# Patient Record
Sex: FEMALE | Race: OTHER | HISPANIC OR LATINO
[De-identification: names, ages, dates, MRNs, and addresses within clinical notes are randomized per-mention and may not be internally consistent; named-entity substitution may affect disease eponyms.]

---

## 2017-01-18 ENCOUNTER — APPOINTMENT (OUTPATIENT)
Dept: RADIOLOGY | Facility: IMAGING CENTER | Age: 27
End: 2017-01-18

## 2017-01-18 ENCOUNTER — OUTPATIENT (OUTPATIENT)
Dept: OUTPATIENT SERVICES | Facility: HOSPITAL | Age: 27
LOS: 1 days | End: 2017-01-18
Payer: COMMERCIAL

## 2017-01-18 DIAGNOSIS — Z00.8 ENCOUNTER FOR OTHER GENERAL EXAMINATION: ICD-10-CM

## 2017-01-18 PROCEDURE — 71046 X-RAY EXAM CHEST 2 VIEWS: CPT

## 2018-04-10 ENCOUNTER — ASOB RESULT (OUTPATIENT)
Age: 28
End: 2018-04-10

## 2018-04-10 ENCOUNTER — APPOINTMENT (OUTPATIENT)
Dept: ANTEPARTUM | Facility: CLINIC | Age: 28
End: 2018-04-10
Payer: COMMERCIAL

## 2018-04-10 PROCEDURE — 76801 OB US < 14 WKS SINGLE FETUS: CPT

## 2018-04-10 PROCEDURE — 36416 COLLJ CAPILLARY BLOOD SPEC: CPT

## 2018-04-10 PROCEDURE — 76813 OB US NUCHAL MEAS 1 GEST: CPT

## 2018-06-11 ENCOUNTER — APPOINTMENT (OUTPATIENT)
Dept: ANTEPARTUM | Facility: CLINIC | Age: 28
End: 2018-06-11
Payer: COMMERCIAL

## 2018-06-11 ENCOUNTER — ASOB RESULT (OUTPATIENT)
Age: 28
End: 2018-06-11

## 2018-06-11 PROCEDURE — 76811 OB US DETAILED SNGL FETUS: CPT

## 2018-06-11 PROCEDURE — 76817 TRANSVAGINAL US OBSTETRIC: CPT | Mod: 59

## 2018-08-03 ENCOUNTER — ASOB RESULT (OUTPATIENT)
Age: 28
End: 2018-08-03

## 2018-08-03 ENCOUNTER — APPOINTMENT (OUTPATIENT)
Dept: ANTEPARTUM | Facility: CLINIC | Age: 28
End: 2018-08-03
Payer: COMMERCIAL

## 2018-08-03 PROCEDURE — 76817 TRANSVAGINAL US OBSTETRIC: CPT

## 2018-08-03 PROCEDURE — 76816 OB US FOLLOW-UP PER FETUS: CPT

## 2018-10-01 ENCOUNTER — ASOB RESULT (OUTPATIENT)
Age: 28
End: 2018-10-01

## 2018-10-01 ENCOUNTER — APPOINTMENT (OUTPATIENT)
Dept: ANTEPARTUM | Facility: CLINIC | Age: 28
End: 2018-10-01
Payer: COMMERCIAL

## 2018-10-01 PROCEDURE — 76816 OB US FOLLOW-UP PER FETUS: CPT

## 2018-10-26 ENCOUNTER — ASOB RESULT (OUTPATIENT)
Age: 28
End: 2018-10-26

## 2018-10-26 ENCOUNTER — INPATIENT (INPATIENT)
Facility: HOSPITAL | Age: 28
LOS: 3 days | Discharge: HOME | End: 2018-10-30
Attending: OBSTETRICS & GYNECOLOGY | Admitting: OBSTETRICS & GYNECOLOGY
Payer: COMMERCIAL

## 2018-10-26 ENCOUNTER — TRANSCRIPTION ENCOUNTER (OUTPATIENT)
Age: 28
End: 2018-10-26

## 2018-10-26 ENCOUNTER — APPOINTMENT (OUTPATIENT)
Dept: ANTEPARTUM | Facility: CLINIC | Age: 28
End: 2018-10-26
Payer: COMMERCIAL

## 2018-10-26 VITALS — WEIGHT: 182.98 LBS | HEIGHT: 62 IN

## 2018-10-26 DIAGNOSIS — O26.899 OTHER SPECIFIED PREGNANCY RELATED CONDITIONS, UNSPECIFIED TRIMESTER: ICD-10-CM

## 2018-10-26 DIAGNOSIS — Z3A.00 WEEKS OF GESTATION OF PREGNANCY NOT SPECIFIED: ICD-10-CM

## 2018-10-26 DIAGNOSIS — Z34.80 ENCOUNTER FOR SUPERVISION OF OTHER NORMAL PREGNANCY, UNSPECIFIED TRIMESTER: ICD-10-CM

## 2018-10-26 LAB
BASOPHILS # BLD AUTO: 0.1 K/UL — SIGNIFICANT CHANGE UP (ref 0–0.2)
BASOPHILS NFR BLD AUTO: 0.9 % — SIGNIFICANT CHANGE UP (ref 0–2)
BLD GP AB SCN SERPL QL: NEGATIVE — SIGNIFICANT CHANGE UP
EOSINOPHIL # BLD AUTO: 0.2 K/UL — SIGNIFICANT CHANGE UP (ref 0–0.5)
EOSINOPHIL NFR BLD AUTO: 1.7 % — SIGNIFICANT CHANGE UP (ref 0–6)
HCT VFR BLD CALC: 37.9 % — SIGNIFICANT CHANGE UP (ref 34.5–45)
HGB BLD-MCNC: 13.6 G/DL — SIGNIFICANT CHANGE UP (ref 11.5–15.5)
LYMPHOCYTES # BLD AUTO: 18 % — SIGNIFICANT CHANGE UP (ref 13–44)
LYMPHOCYTES # BLD AUTO: 2 K/UL — SIGNIFICANT CHANGE UP (ref 1–3.3)
MCHC RBC-ENTMCNC: 32.7 PG — SIGNIFICANT CHANGE UP (ref 27–34)
MCHC RBC-ENTMCNC: 35.8 GM/DL — SIGNIFICANT CHANGE UP (ref 32–36)
MCV RBC AUTO: 91.2 FL — SIGNIFICANT CHANGE UP (ref 80–100)
MONOCYTES # BLD AUTO: 0.7 K/UL — SIGNIFICANT CHANGE UP (ref 0–0.9)
MONOCYTES NFR BLD AUTO: 6.1 % — SIGNIFICANT CHANGE UP (ref 2–14)
NEUTROPHILS # BLD AUTO: 8 K/UL — HIGH (ref 1.8–7.4)
NEUTROPHILS NFR BLD AUTO: 73.3 % — SIGNIFICANT CHANGE UP (ref 43–77)
PLATELET # BLD AUTO: 179 K/UL — SIGNIFICANT CHANGE UP (ref 150–400)
RBC # BLD: 4.16 M/UL — SIGNIFICANT CHANGE UP (ref 3.8–5.2)
RBC # FLD: 12.1 % — SIGNIFICANT CHANGE UP (ref 10.3–14.5)
RH IG SCN BLD-IMP: POSITIVE — SIGNIFICANT CHANGE UP
RH IG SCN BLD-IMP: POSITIVE — SIGNIFICANT CHANGE UP
T PALLIDUM AB TITR SER: NEGATIVE — SIGNIFICANT CHANGE UP
WBC # BLD: 10.9 K/UL — HIGH (ref 3.8–10.5)
WBC # FLD AUTO: 10.9 K/UL — HIGH (ref 3.8–10.5)

## 2018-10-26 PROCEDURE — 76816 OB US FOLLOW-UP PER FETUS: CPT | Mod: 26

## 2018-10-26 PROCEDURE — 76818 FETAL BIOPHYS PROFILE W/NST: CPT | Mod: 26

## 2018-10-26 RX ORDER — OXYTOCIN 10 UNIT/ML
333.33 VIAL (ML) INJECTION
Qty: 20 | Refills: 0 | Status: DISCONTINUED | OUTPATIENT
Start: 2018-10-26 | End: 2018-10-27

## 2018-10-26 RX ORDER — SODIUM CHLORIDE 9 MG/ML
1000 INJECTION, SOLUTION INTRAVENOUS
Qty: 0 | Refills: 0 | Status: DISCONTINUED | OUTPATIENT
Start: 2018-10-26 | End: 2018-10-27

## 2018-10-26 RX ORDER — ONDANSETRON 8 MG/1
4 TABLET, FILM COATED ORAL ONCE
Qty: 0 | Refills: 0 | Status: COMPLETED | OUTPATIENT
Start: 2018-10-26 | End: 2018-10-26

## 2018-10-26 RX ORDER — BUTORPHANOL TARTRATE 2 MG/ML
2 INJECTION, SOLUTION INTRAMUSCULAR; INTRAVENOUS ONCE
Qty: 0 | Refills: 0 | Status: DISCONTINUED | OUTPATIENT
Start: 2018-10-26 | End: 2018-10-26

## 2018-10-26 RX ORDER — SODIUM CHLORIDE 9 MG/ML
1000 INJECTION, SOLUTION INTRAVENOUS
Qty: 0 | Refills: 0 | Status: DISCONTINUED | OUTPATIENT
Start: 2018-10-26 | End: 2018-10-26

## 2018-10-26 RX ORDER — SODIUM CHLORIDE 9 MG/ML
500 INJECTION, SOLUTION INTRAVENOUS
Qty: 0 | Refills: 0 | Status: DISCONTINUED | OUTPATIENT
Start: 2018-10-26 | End: 2018-10-30

## 2018-10-26 RX ORDER — CITRIC ACID/SODIUM CITRATE 300-500 MG
15 SOLUTION, ORAL ORAL EVERY 4 HOURS
Qty: 0 | Refills: 0 | Status: DISCONTINUED | OUTPATIENT
Start: 2018-10-26 | End: 2018-10-27

## 2018-10-26 RX ORDER — SODIUM CHLORIDE 9 MG/ML
500 INJECTION, SOLUTION INTRAVENOUS ONCE
Qty: 0 | Refills: 0 | Status: DISCONTINUED | OUTPATIENT
Start: 2018-10-26 | End: 2018-10-27

## 2018-10-26 RX ADMIN — SODIUM CHLORIDE 125 MILLILITER(S): 9 INJECTION, SOLUTION INTRAVENOUS at 11:25

## 2018-10-26 RX ADMIN — SODIUM CHLORIDE 250 MILLILITER(S): 9 INJECTION, SOLUTION INTRAVENOUS at 14:41

## 2018-10-26 RX ADMIN — BUTORPHANOL TARTRATE 2 MILLIGRAM(S): 2 INJECTION, SOLUTION INTRAMUSCULAR; INTRAVENOUS at 20:46

## 2018-10-26 RX ADMIN — ONDANSETRON 4 MILLIGRAM(S): 8 TABLET, FILM COATED ORAL at 20:45

## 2018-10-26 RX ADMIN — Medication 15 MILLILITER(S): at 14:40

## 2018-10-26 RX ADMIN — SODIUM CHLORIDE 250 MILLILITER(S): 9 INJECTION, SOLUTION INTRAVENOUS at 11:26

## 2018-10-26 RX ADMIN — BUTORPHANOL TARTRATE 2 MILLIGRAM(S): 2 INJECTION, SOLUTION INTRAMUSCULAR; INTRAVENOUS at 21:44

## 2018-10-26 NOTE — PATIENT PROFILE OB - AS SC BRADEN MOISTURE
Problem: Patient Care Overview (Adult)  Goal: Adult Individualization and Mutuality  Outcome: Ongoing (interventions implemented as appropriate)    03/03/17 0650 03/15/17 1736 03/20/17 0404   Individualization   Patient Specific Preferences --  red jello, gatorade, chocolate ice cream for snacks --    Patient Specific Goals to have a place to go when she leaves here --  --    Patient Specific Interventions --  airborne precautions --    Mutuality/Individual Preferences   What Anxieties, Fears or Concerns Do You Have About Your Health or Care? concerned about medicine and where she will go when she leaves here --  --    What Questions Do You Have About Your Health or Care? --  --  Has concerns what her ultimate diagnosis is going to be and how that will effect the rest of her life   What Information Would Help Us Give You More Personalized Care? none at this time --  --        Goal: Discharge Needs Assessment  Outcome: Ongoing (interventions implemented as appropriate)    03/01/17 1548 03/03/17 0650 03/09/17 1743   Discharge Needs Assessment   Concerns To Be Addressed --  homelessness;financial/insurance concerns;mental health concerns;transportation;medication concerns --    Readmission Within The Last 30 Days --  --  no previous admission in last 30 days   Equipment Needed After Discharge --  --  --    Current Discharge Risk homeless --  --    Discharge Disposition --  --  --    Current Health   Anticipated Changes Related to Illness --  --  none   Self-Care   Equipment Currently Used at Home --  --  --    Living Environment   Transportation Available --  --  --      03/11/17 1845 03/15/17 0904 03/22/17 1604   Discharge Needs Assessment   Concerns To Be Addressed --  --  --    Readmission Within The Last 30 Days --  --  --    Equipment Needed After Discharge none --  --    Current Discharge Risk --  --  --    Discharge Disposition --  --  still a patient   Current Health   Anticipated Changes Related to Illness --   --  --    Self-Care   Equipment Currently Used at Home --  bath bench --    Living Environment   Transportation Available --  --  taxi         Problem: Pneumonia (Adult)  Goal: Signs and Symptoms of Listed Potential Problems Will be Absent or Manageable (Pneumonia)  Outcome: Ongoing (interventions implemented as appropriate)    03/23/17 1658   Pneumonia   Problems Assessed (Pneumonia) all   Problems Present (Pneumonia) progression of infection         Problem: COPD, Chronic Bronchitis/Emphysema (Adult)  Goal: Signs and Symptoms of Listed Potential Problems Will be Absent or Manageable (COPD, Chronic Bronchitis/Emphysema)  Outcome: Ongoing (interventions implemented as appropriate)    03/23/17 0307   COPD, Chronic Bronchitis/Emphysema   Problems Assessed (COPD, Chronic Bronchitis/Emphysema) all   Problems Present (COPD, Chronic Bronchitis/Emphysema) depression;dyspnea;situational response         Problem: Wound, Traumatic, Nonburn (Adult)  Goal: Signs and Symptoms of Listed Potential Problems Will be Absent or Manageable (Wound, Traumatic, Nonburn)  Outcome: Ongoing (interventions implemented as appropriate)    03/23/17 0307   Wound, Traumatic, Nonburn   Problems Assessed (Wound) all   Problems Present (Wound) pain;skin breakdown         Problem: Pain, Acute (Adult)  Goal: Acceptable Pain Control/Comfort Level  Outcome: Ongoing (interventions implemented as appropriate)    03/23/17 1658   Pain, Acute (Adult)   Acceptable Pain Control/Comfort Level making progress toward outcome         Problem: Anxiety (Adult)  Goal: Reduction/Resolution  Outcome: Ongoing (interventions implemented as appropriate)    03/23/17 1658   Anxiety (Adult)   Reduction/Resolution making progress toward outcome         Problem: Bronchoscopy (Adult)  Goal: Signs and Symptoms of Listed Potential Problems Will be Absent or Manageable (Bronchoscopy)  Outcome: Ongoing (interventions implemented as appropriate)    03/23/17 0307   Bronchoscopy    Problems Assessed (Bronchoscopy) all   Problems Present (Bronchoscopy) none         Problem: Pulmonary Tuberculosis (Adult)  Goal: Signs and Symptoms of Listed Potential Problems Will be Absent or Manageable (Pulmonary Tuberculosis)  Outcome: Ongoing (interventions implemented as appropriate)    03/23/17 0307   Pulmonary Tuberculosis   Problems Assessed (Pulmonary Tuberculosis) all   Problems Present (Pulmonary Tuberculosis) pain;situational response            (4) rarely moist

## 2018-10-26 NOTE — PATIENT PROFILE OB - ALERT: PERTINENT HISTORY
BioPhysical Profile(s)/1st Trimester Sonogram/20 Week Level II Sonogram/Follow up Sonogram for Growth/Fetal Non-Stress Test (NST)/Non Invasive Prenatal Screen (NIPS)/Ultra Screen at 12 Weeks

## 2018-10-27 LAB
HCT VFR BLD CALC: 34.8 % — SIGNIFICANT CHANGE UP (ref 34.5–45)
HGB BLD-MCNC: 12.1 G/DL — SIGNIFICANT CHANGE UP (ref 11.5–15.5)
MCHC RBC-ENTMCNC: 31.8 PG — SIGNIFICANT CHANGE UP (ref 27–34)
MCHC RBC-ENTMCNC: 34.7 GM/DL — SIGNIFICANT CHANGE UP (ref 32–36)
MCV RBC AUTO: 91.5 FL — SIGNIFICANT CHANGE UP (ref 80–100)
PLATELET # BLD AUTO: 155 K/UL — SIGNIFICANT CHANGE UP (ref 150–400)
RBC # BLD: 3.8 M/UL — SIGNIFICANT CHANGE UP (ref 3.8–5.2)
RBC # FLD: 12.8 % — SIGNIFICANT CHANGE UP (ref 10.3–14.5)
WBC # BLD: 16 K/UL — HIGH (ref 3.8–10.5)
WBC # FLD AUTO: 16 K/UL — HIGH (ref 3.8–10.5)

## 2018-10-27 PROCEDURE — 93010 ELECTROCARDIOGRAM REPORT: CPT

## 2018-10-27 RX ORDER — FERROUS SULFATE 325(65) MG
325 TABLET ORAL DAILY
Qty: 0 | Refills: 0 | Status: DISCONTINUED | OUTPATIENT
Start: 2018-10-27 | End: 2018-10-30

## 2018-10-27 RX ORDER — HEPARIN SODIUM 5000 [USP'U]/ML
5000 INJECTION INTRAVENOUS; SUBCUTANEOUS EVERY 12 HOURS
Qty: 0 | Refills: 0 | Status: DISCONTINUED | OUTPATIENT
Start: 2018-10-27 | End: 2018-10-30

## 2018-10-27 RX ORDER — LANOLIN
1 OINTMENT (GRAM) TOPICAL
Qty: 0 | Refills: 0 | Status: DISCONTINUED | OUTPATIENT
Start: 2018-10-27 | End: 2018-10-30

## 2018-10-27 RX ORDER — OXYTOCIN 10 UNIT/ML
333.33 VIAL (ML) INJECTION
Qty: 20 | Refills: 0 | Status: DISCONTINUED | OUTPATIENT
Start: 2018-10-27 | End: 2018-10-30

## 2018-10-27 RX ORDER — SENNA PLUS 8.6 MG/1
2 TABLET ORAL AT BEDTIME
Qty: 0 | Refills: 0 | Status: DISCONTINUED | OUTPATIENT
Start: 2018-10-27 | End: 2018-10-30

## 2018-10-27 RX ORDER — OXYTOCIN 10 UNIT/ML
333.33 VIAL (ML) INJECTION
Qty: 20 | Refills: 0 | Status: COMPLETED | OUTPATIENT
Start: 2018-10-27

## 2018-10-27 RX ORDER — KETOROLAC TROMETHAMINE 30 MG/ML
30 SYRINGE (ML) INJECTION EVERY 6 HOURS
Qty: 0 | Refills: 0 | Status: DISCONTINUED | OUTPATIENT
Start: 2018-10-27 | End: 2018-10-28

## 2018-10-27 RX ORDER — ACETAMINOPHEN 500 MG
1000 TABLET ORAL ONCE
Qty: 0 | Refills: 0 | Status: COMPLETED | OUTPATIENT
Start: 2018-10-27 | End: 2018-10-28

## 2018-10-27 RX ORDER — ACETAMINOPHEN 500 MG
1000 TABLET ORAL ONCE
Qty: 0 | Refills: 0 | Status: COMPLETED | OUTPATIENT
Start: 2018-10-27 | End: 2018-10-27

## 2018-10-27 RX ORDER — GLYCERIN ADULT
1 SUPPOSITORY, RECTAL RECTAL AT BEDTIME
Qty: 0 | Refills: 0 | Status: DISCONTINUED | OUTPATIENT
Start: 2018-10-27 | End: 2018-10-30

## 2018-10-27 RX ORDER — SODIUM CHLORIDE 9 MG/ML
1000 INJECTION, SOLUTION INTRAVENOUS ONCE
Qty: 0 | Refills: 0 | Status: DISCONTINUED | OUTPATIENT
Start: 2018-10-27 | End: 2018-10-30

## 2018-10-27 RX ORDER — KETOROLAC TROMETHAMINE 30 MG/ML
30 SYRINGE (ML) INJECTION ONCE
Qty: 0 | Refills: 0 | Status: DISCONTINUED | OUTPATIENT
Start: 2018-10-27 | End: 2018-10-27

## 2018-10-27 RX ORDER — IBUPROFEN 200 MG
600 TABLET ORAL EVERY 6 HOURS
Qty: 0 | Refills: 0 | Status: COMPLETED | OUTPATIENT
Start: 2018-10-27 | End: 2019-09-25

## 2018-10-27 RX ORDER — FAMOTIDINE 10 MG/ML
20 INJECTION INTRAVENOUS ONCE
Qty: 0 | Refills: 0 | Status: COMPLETED | OUTPATIENT
Start: 2018-10-27 | End: 2018-10-27

## 2018-10-27 RX ORDER — TETANUS TOXOID, REDUCED DIPHTHERIA TOXOID AND ACELLULAR PERTUSSIS VACCINE, ADSORBED 5; 2.5; 8; 8; 2.5 [IU]/.5ML; [IU]/.5ML; UG/.5ML; UG/.5ML; UG/.5ML
0.5 SUSPENSION INTRAMUSCULAR ONCE
Qty: 0 | Refills: 0 | Status: DISCONTINUED | OUTPATIENT
Start: 2018-10-27 | End: 2018-10-30

## 2018-10-27 RX ORDER — OXYCODONE HYDROCHLORIDE 5 MG/1
5 TABLET ORAL
Qty: 0 | Refills: 0 | Status: DISCONTINUED | OUTPATIENT
Start: 2018-10-27 | End: 2018-10-30

## 2018-10-27 RX ORDER — DOCUSATE SODIUM 100 MG
100 CAPSULE ORAL
Qty: 0 | Refills: 0 | Status: DISCONTINUED | OUTPATIENT
Start: 2018-10-27 | End: 2018-10-30

## 2018-10-27 RX ORDER — ACETAMINOPHEN 500 MG
1000 TABLET ORAL EVERY 6 HOURS
Qty: 0 | Refills: 0 | Status: COMPLETED | OUTPATIENT
Start: 2018-10-27 | End: 2018-10-27

## 2018-10-27 RX ORDER — OXYCODONE HYDROCHLORIDE 5 MG/1
5 TABLET ORAL
Qty: 0 | Refills: 0 | Status: COMPLETED | OUTPATIENT
Start: 2018-10-27 | End: 2018-11-03

## 2018-10-27 RX ORDER — OXYTOCIN 10 UNIT/ML
41.67 VIAL (ML) INJECTION
Qty: 20 | Refills: 0 | Status: DISCONTINUED | OUTPATIENT
Start: 2018-10-27 | End: 2018-10-30

## 2018-10-27 RX ORDER — SODIUM CHLORIDE 9 MG/ML
1000 INJECTION, SOLUTION INTRAVENOUS
Qty: 0 | Refills: 0 | Status: DISCONTINUED | OUTPATIENT
Start: 2018-10-27 | End: 2018-10-30

## 2018-10-27 RX ORDER — DIPHENHYDRAMINE HCL 50 MG
25 CAPSULE ORAL EVERY 6 HOURS
Qty: 0 | Refills: 0 | Status: DISCONTINUED | OUTPATIENT
Start: 2018-10-27 | End: 2018-10-30

## 2018-10-27 RX ORDER — OXYCODONE HYDROCHLORIDE 5 MG/1
5 TABLET ORAL EVERY 4 HOURS
Qty: 0 | Refills: 0 | Status: COMPLETED | OUTPATIENT
Start: 2018-10-27 | End: 2018-11-03

## 2018-10-27 RX ORDER — OXYCODONE HYDROCHLORIDE 5 MG/1
5 TABLET ORAL EVERY 4 HOURS
Qty: 0 | Refills: 0 | Status: DISCONTINUED | OUTPATIENT
Start: 2018-10-27 | End: 2018-10-30

## 2018-10-27 RX ORDER — SIMETHICONE 80 MG/1
80 TABLET, CHEWABLE ORAL EVERY 4 HOURS
Qty: 0 | Refills: 0 | Status: DISCONTINUED | OUTPATIENT
Start: 2018-10-27 | End: 2018-10-30

## 2018-10-27 RX ADMIN — Medication 1000 MILLIUNIT(S)/MIN: at 04:45

## 2018-10-27 RX ADMIN — Medication 400 MILLIGRAM(S): at 22:45

## 2018-10-27 RX ADMIN — Medication 30 MILLIGRAM(S): at 18:54

## 2018-10-27 RX ADMIN — Medication 1000 MILLIGRAM(S): at 16:41

## 2018-10-27 RX ADMIN — Medication 400 MILLIGRAM(S): at 16:41

## 2018-10-27 RX ADMIN — FAMOTIDINE 20 MILLIGRAM(S): 10 INJECTION INTRAVENOUS at 09:00

## 2018-10-27 RX ADMIN — HEPARIN SODIUM 5000 UNIT(S): 5000 INJECTION INTRAVENOUS; SUBCUTANEOUS at 18:54

## 2018-10-27 RX ADMIN — Medication 1000 MILLIGRAM(S): at 23:40

## 2018-10-27 RX ADMIN — Medication 30 MILLIGRAM(S): at 07:12

## 2018-10-27 RX ADMIN — SIMETHICONE 80 MILLIGRAM(S): 80 TABLET, CHEWABLE ORAL at 16:38

## 2018-10-27 NOTE — CHART NOTE - NSCHARTNOTEFT_GEN_A_CORE
R1 Event note:     29yo POD#0 s/p uncomplicated LTCS, evaluated for c/o chest pain, dizziness, and "tingling down the back". No past medical/surgical history. Patient reports that upon being transferred from PACU to the floor, she suddenly felt tightness in the middle of her chest, associated with lightheadedness and tingling. The CP is non-radiating, and is improved with reclining. Lightheadedness occurred while in a sitting position. Tingling is non-radiating and located near the site of the epidural. Denies abdominal pain, n/v, changes in vision, and headache. Patient has not had a meal yet. Pain is well controlled. Of note, at the time of placement of the epidural, initially a high block was performed and the epidural had to be re-done.     O:   Vital Signs Last 24 Hrs  T(C): 36.9 (27 Oct 2018 07:29), Max: 37.1 (27 Oct 2018 04:40)  T(F): 98.4 (27 Oct 2018 07:29), Max: 98.8 (27 Oct 2018 04:40)  HR: 80 (27 Oct 2018 07:29) (66 - 88)  BP: 121/65 (27 Oct 2018 07:29) (121/63 - 135/68)  BP(mean): 86 (27 Oct 2018 06:55) (84 - 95)  RR: 14 (27 Oct 2018 07:29) (12 - 23)  SpO2: 96% (27 Oct 2018 07:29) (95% - 97%)    Labs:                        13.6   10.9<H> >-----------< 179    ( 10-26 @ 11:23 )             37.9    PE:  General: NAD  CV: RRR, S1/S2  Resp: CTABL   Abdomen: soft, mildly distended, NT, incision c/d/i.  : appropriate lochia  Extremities: No LE erythema/pitting edema/tenderness  EKG: normal sinus rhythm   Neuro: epidural catheter intact      A/P:   29yo POD#0 s/p uncomplicated pLTCS for arrest of descent, seen at bedside for c/o CP, lightheadedness, and paresthesias at the site of the epidural. No other associated symptoms. . Physical exam is benign. EKG wnl. Vitals are stable. Symptoms likely 2/2 the high block that resulted from the initial epidural performed. The epidural catheter was removed at bedside by pain management. Low suspicion for cardiac-related etiology and less likely a PE.    -Re-evaluate in 1 hour. If patient's symptoms have not improved, will perform pulmonary CTA to evaluate for PE. Risk factors include pregnancy and recent surgery.  -F/u stat CBC  -IV pepcid   -Start routine oral analgesics for pain control.   -Continue to monitor vitals.  -Routine postpartum care.    Patient examined w/ attending physician, Dr. Luis Rousseau PGY-1

## 2018-10-27 NOTE — PROVIDER CONTACT NOTE (CHANGE IN STATUS NOTIFICATION) - SITUATION
Called to room by patient via callbell-Patient complaining of lightheadedness, tingling down back and epigastric chest pain.

## 2018-10-27 NOTE — PROVIDER CONTACT NOTE (CHANGE IN STATUS NOTIFICATION) - SITUATION
Doctors and pain service personnel were in attendance.  12 lead ECG was done, patient was given pepcid IVP., TYLENOL IVPB were all given as ordered    as patient was verbally reassured

## 2018-10-27 NOTE — PROVIDER CONTACT NOTE (CHANGE IN STATUS NOTIFICATION) - RECOMMENDATIONS
continue with comfort care and medicate as appropriate.  pt. was observed every 15 mins. and infant was held in the well baby nursery until pt. was stable for several hours.

## 2018-10-28 LAB
BASOPHILS # BLD AUTO: 0.04 K/UL — SIGNIFICANT CHANGE UP (ref 0–0.2)
BASOPHILS NFR BLD AUTO: 0.3 % — SIGNIFICANT CHANGE UP (ref 0–2)
EOSINOPHIL # BLD AUTO: 0.26 K/UL — SIGNIFICANT CHANGE UP (ref 0–0.5)
EOSINOPHIL NFR BLD AUTO: 1.9 % — SIGNIFICANT CHANGE UP (ref 0–6)
HCT VFR BLD CALC: 32.4 % — LOW (ref 34.5–45)
HGB BLD-MCNC: 10.8 G/DL — LOW (ref 11.5–15.5)
IMM GRANULOCYTES NFR BLD AUTO: 0.6 % — SIGNIFICANT CHANGE UP (ref 0–1.5)
LYMPHOCYTES # BLD AUTO: 16.3 % — SIGNIFICANT CHANGE UP (ref 13–44)
LYMPHOCYTES # BLD AUTO: 2.24 K/UL — SIGNIFICANT CHANGE UP (ref 1–3.3)
MCHC RBC-ENTMCNC: 30.5 PG — SIGNIFICANT CHANGE UP (ref 27–34)
MCHC RBC-ENTMCNC: 33.3 GM/DL — SIGNIFICANT CHANGE UP (ref 32–36)
MCV RBC AUTO: 91.5 FL — SIGNIFICANT CHANGE UP (ref 80–100)
MONOCYTES # BLD AUTO: 0.72 K/UL — SIGNIFICANT CHANGE UP (ref 0–0.9)
MONOCYTES NFR BLD AUTO: 5.2 % — SIGNIFICANT CHANGE UP (ref 2–14)
NEUTROPHILS # BLD AUTO: 10.41 K/UL — HIGH (ref 1.8–7.4)
NEUTROPHILS NFR BLD AUTO: 75.7 % — SIGNIFICANT CHANGE UP (ref 43–77)
PLATELET # BLD AUTO: 156 K/UL — SIGNIFICANT CHANGE UP (ref 150–400)
RBC # BLD: 3.54 M/UL — LOW (ref 3.8–5.2)
RBC # FLD: 13.5 % — SIGNIFICANT CHANGE UP (ref 10.3–14.5)
WBC # BLD: 13.75 K/UL — HIGH (ref 3.8–10.5)
WBC # FLD AUTO: 13.75 K/UL — HIGH (ref 3.8–10.5)

## 2018-10-28 RX ORDER — ACETAMINOPHEN 500 MG
975 TABLET ORAL EVERY 6 HOURS
Qty: 0 | Refills: 0 | Status: COMPLETED | OUTPATIENT
Start: 2018-10-28 | End: 2019-09-26

## 2018-10-28 RX ORDER — ONDANSETRON 8 MG/1
4 TABLET, FILM COATED ORAL ONCE
Qty: 0 | Refills: 0 | Status: COMPLETED | OUTPATIENT
Start: 2018-10-28 | End: 2018-10-28

## 2018-10-28 RX ORDER — ACETAMINOPHEN 500 MG
975 TABLET ORAL EVERY 6 HOURS
Qty: 0 | Refills: 0 | Status: DISCONTINUED | OUTPATIENT
Start: 2018-10-28 | End: 2018-10-30

## 2018-10-28 RX ORDER — IBUPROFEN 200 MG
600 TABLET ORAL EVERY 6 HOURS
Qty: 0 | Refills: 0 | Status: DISCONTINUED | OUTPATIENT
Start: 2018-10-28 | End: 2018-10-30

## 2018-10-28 RX ADMIN — SIMETHICONE 80 MILLIGRAM(S): 80 TABLET, CHEWABLE ORAL at 03:05

## 2018-10-28 RX ADMIN — Medication 325 MILLIGRAM(S): at 14:41

## 2018-10-28 RX ADMIN — HEPARIN SODIUM 5000 UNIT(S): 5000 INJECTION INTRAVENOUS; SUBCUTANEOUS at 06:20

## 2018-10-28 RX ADMIN — ONDANSETRON 4 MILLIGRAM(S): 8 TABLET, FILM COATED ORAL at 14:40

## 2018-10-28 RX ADMIN — Medication 975 MILLIGRAM(S): at 21:52

## 2018-10-28 RX ADMIN — Medication 600 MILLIGRAM(S): at 18:43

## 2018-10-28 RX ADMIN — Medication 30 MILLIGRAM(S): at 07:00

## 2018-10-28 RX ADMIN — Medication 30 MILLIGRAM(S): at 00:42

## 2018-10-28 RX ADMIN — Medication 30 MILLIGRAM(S): at 01:40

## 2018-10-28 RX ADMIN — Medication 1 TABLET(S): at 14:41

## 2018-10-28 RX ADMIN — HEPARIN SODIUM 5000 UNIT(S): 5000 INJECTION INTRAVENOUS; SUBCUTANEOUS at 17:29

## 2018-10-28 RX ADMIN — Medication 30 MILLIGRAM(S): at 06:21

## 2018-10-28 RX ADMIN — Medication 30 MILLIGRAM(S): at 12:30

## 2018-10-28 RX ADMIN — Medication 975 MILLIGRAM(S): at 15:33

## 2018-10-28 RX ADMIN — Medication 1000 MILLIGRAM(S): at 06:00

## 2018-10-28 RX ADMIN — Medication 600 MILLIGRAM(S): at 17:29

## 2018-10-28 RX ADMIN — Medication 30 MILLIGRAM(S): at 11:48

## 2018-10-28 RX ADMIN — Medication 400 MILLIGRAM(S): at 05:00

## 2018-10-28 RX ADMIN — Medication 975 MILLIGRAM(S): at 14:45

## 2018-10-28 NOTE — PROGRESS NOTE ADULT - PROBLEM SELECTOR PLAN 1
- EKG 2/2 CP NSR  - Continue Pepcid daily  - Continue regular diet.  - Increase ambulation.  - Continue motrin, tylenol, oxycodone PRN for pain control  - F/u AM CBC    Lauren Yang PGY1 - EKG 2/2 CP NSR  - Continue Pepcid daily  - Continue regular diet.  - Increase ambulation.  - Continue motrin, tylenol, oxycodone PRN for pain control. IV tylenol PRN.  - F/u AM CBC    Lauren Yang PGY1

## 2018-10-28 NOTE — PROGRESS NOTE ADULT - SUBJECTIVE AND OBJECTIVE BOX
OB Progress Note:  Delivery, POD#1    S: 29yo POD#1 s/p LTCS c/b PP CP and lightheadedness. PCEA d/anish and patient feeling much improved. Her pain is well controlled on PO meds. She is tolerating a regular diet and passing flatus. Denies N/V. Denies CP/SOB/lightheadedness/dizziness.   She is ambulating without difficulty.   Voiding spontaneously. Dressing in place.    O:   Vital Signs Last 24 Hrs  T(C): 36.9 (27 Oct 2018 18:11), Max: 37.1 (27 Oct 2018 04:40)  T(F): 98.4 (27 Oct 2018 18:11), Max: 98.8 (27 Oct 2018 04:40)  HR: 78 (27 Oct 2018 18:11) (66 - 88)  BP: 113/67 (27 Oct 2018 18:11) (109/71 - 135/68)  BP(mean): 86 (27 Oct 2018 06:55) (84 - 95)  RR: 18 (27 Oct 2018 18:11) (12 - 23)  SpO2: 98% (27 Oct 2018 18:11) (95% - 99%)    Labs:  Blood type: A Positive  Rubella IgG: RPR: Negative                          12.1   16.0<H> >-----------< 155    ( 10-27 @ 09:06 )             34.8                        13.6   10.9<H> >-----------< 179    ( 10-26 @ 11:23 )             37.9                  PE:  General: NAD  Abdomen: Mildly distended, appropriately tender, incision c/d/i. Closed with...  Extremities: No erythema, no pitting edema OB Progress Note:  Delivery, POD#1    S: 29yo POD#1 s/p LTCS c/b PP CP and lightheadedness. PCEA d/anish and patient feeling much improved on PO meds. CP has resolved. Her pain is well controlled on PO meds. She is tolerating a regular diet and passing flatus. Denies N/V. Denies CP/SOB/lightheadedness/dizziness.   She is ambulating without difficulty.   Voiding spontaneously.    O:   Vital Signs Last 24 Hrs  T(C): 36.9 (27 Oct 2018 18:11), Max: 37.1 (27 Oct 2018 04:40)  T(F): 98.4 (27 Oct 2018 18:11), Max: 98.8 (27 Oct 2018 04:40)  HR: 78 (27 Oct 2018 18:11) (66 - 88)  BP: 113/67 (27 Oct 2018 18:11) (109/71 - 135/68)  BP(mean): 86 (27 Oct 2018 06:55) (84 - 95)  RR: 18 (27 Oct 2018 18:11) (12 - 23)  SpO2: 98% (27 Oct 2018 18:11) (95% - 99%)    Labs:  Blood type: A Positive  Rubella IgG: RPR: Negative                          12.1   16.0<H> >-----------< 155    ( 10-27 @ 09:06 )             34.8                        13.6   10.9<H> >-----------< 179    ( 10-26 @ 11:23 )             37.9                  PE:  General: NAD  Abdomen: Mildly distended, appropriately tender, incision c/d/i. Closed with dermabond.  Extremities: No erythema, no pitting edema

## 2018-10-28 NOTE — PROGRESS NOTE ADULT - ASSESSMENT
A/P: 29yo POD#1 s/p LTCS c/b PP CP and lightheadedness. Patient is stable and doing well post-operatively. A/P: 27yo POD#1 s/p LTCS c/b PP CP and lightheadedness now resolved with PCEA removal. Patient is stable and doing well post-operatively.

## 2018-10-29 RX ADMIN — Medication 600 MILLIGRAM(S): at 09:01

## 2018-10-29 RX ADMIN — Medication 600 MILLIGRAM(S): at 04:00

## 2018-10-29 RX ADMIN — Medication 600 MILLIGRAM(S): at 17:16

## 2018-10-29 RX ADMIN — Medication 100 MILLIGRAM(S): at 09:01

## 2018-10-29 RX ADMIN — Medication 600 MILLIGRAM(S): at 23:23

## 2018-10-29 RX ADMIN — Medication 600 MILLIGRAM(S): at 03:07

## 2018-10-29 RX ADMIN — HEPARIN SODIUM 5000 UNIT(S): 5000 INJECTION INTRAVENOUS; SUBCUTANEOUS at 07:09

## 2018-10-29 RX ADMIN — Medication 600 MILLIGRAM(S): at 10:15

## 2018-10-29 NOTE — PROGRESS NOTE ADULT - SUBJECTIVE AND OBJECTIVE BOX
Postpartum Note,  Section   ATTENDING NOTE Post-operative day 2    Subjective:  The patient feels well.  She is ambulating.   She is tolerating regular diet.  She denies nausea and vomiting.  She is voiding.  Her pain is controlled.  She reports normal postpartum bleeding    Physical exam:    Vital Signs Last 24 Hrs  T(C): 36.8 (29 Oct 2018 09:52), Max: 37.1 (28 Oct 2018 13:00)  T(F): 98.2 (29 Oct 2018 09:52), Max: 98.7 (28 Oct 2018 13:00)  HR: 76 (29 Oct 2018 09:52) (61 - 76)  BP: 119/79 (29 Oct 2018 09:52) (111/74 - 134/87)  BP(mean): --  RR: 18 (29 Oct 2018 09:52) (18 - 18)  SpO2: 97% (29 Oct 2018 06:45) (97% - 97%)    Gen: NAD  Breast: Soft, nontender, not engorged.  Abdomen: Soft, nontender, no distension , firm uterine fundus at umbilicus.  Incision: Clean, dry, and intact  Pelvic: Normal lochia noted  Ext: No calf tenderness    LABS:                        10.8   13.75 )-----------( 156      ( 28 Oct 2018 08:32 )             32.4                   Allergies    No Known Allergies    Intolerances      MEDICATIONS  (STANDING):  acetaminophen   Tablet .. 975 milliGRAM(s) Oral every 6 hours  dextrose 5% + lactated ringers. 500 milliLiter(s) (250 mL/Hr) IV Continuous <Continuous>  diphtheria/tetanus/pertussis (acellular) Vaccine (ADAcel) 0.5 milliLiter(s) IntraMuscular once  ferrous    sulfate 325 milliGRAM(s) Oral daily  heparin  Injectable 5000 Unit(s) SubCutaneous every 12 hours  ibuprofen  Tablet. 600 milliGRAM(s) Oral every 6 hours  lactated ringers Bolus 1000 milliLiter(s) IV Bolus once  lactated ringers. 1000 milliLiter(s) (125 mL/Hr) IV Continuous <Continuous>  misoprostol Oral Solution 60 MICROGram(s) Oral every 2 hours  oxyCODONE    IR 5 milliGRAM(s) Oral every 3 hours  oxytocin Infusion 41.667 milliUNIT(s)/Min (125 mL/Hr) IV Continuous <Continuous>  oxytocin Infusion 333.333 milliUNIT(s)/Min (1000 mL/Hr) IV Continuous <Continuous>  prenatal multivitamin 1 Tablet(s) Oral daily    MEDICATIONS  (PRN):  diphenhydrAMINE 25 milliGRAM(s) Oral every 6 hours PRN Itching  docusate sodium 100 milliGRAM(s) Oral two times a day PRN Stool Softening  glycerin Suppository - Adult 1 Suppository(s) Rectal at bedtime PRN Constipation  lanolin Ointment 1 Application(s) Topical every 3 hours PRN Sore Nipples  oxyCODONE    IR 5 milliGRAM(s) Oral every 4 hours PRN Severe Pain (7 - 10)  senna 2 Tablet(s) Oral at bedtime PRN Constipation  simethicone 80 milliGRAM(s) Chew every 4 hours PRN Gas        Assessment and Plan  POD # 2  s/p  section. Stable.  Encourage ambulation  Analgesia prn  Regular diet as tolerated

## 2018-10-29 NOTE — PROGRESS NOTE ADULT - SUBJECTIVE AND OBJECTIVE BOX
OB Progress Note: pTLCS, POD#2    S: 27yo G P POD#2 s/p pLTCS for arrest c/b chest pain and lightheadedness and tingling in the back on POD0 s/p PCEA. Pain is well controlled. She is tolerating a regular diet and passing flatus. She is voiding spontaneously, and ambulating without difficulty. Denies CP/SOB. Denies lightheadedness/dizziness. Denies N/V.    O:  Vitals:  Vital Signs Last 24 Hrs  T(C): 36.8 (29 Oct 2018 06:45), Max: 37.1 (28 Oct 2018 13:00)  T(F): 98.2 (29 Oct 2018 06:45), Max: 98.7 (28 Oct 2018 13:00)  HR: 67 (29 Oct 2018 06:45) (61 - 71)  BP: 126/79 (29 Oct 2018 06:45) (111/74 - 134/87)  BP(mean): --  RR: 18 (29 Oct 2018 06:45) (18 - 18)  SpO2: 97% (29 Oct 2018 06:45) (97% - 98%)    MEDICATIONS  (STANDING):  acetaminophen   Tablet .. 975 milliGRAM(s) Oral every 6 hours  dextrose 5% + lactated ringers. 500 milliLiter(s) (250 mL/Hr) IV Continuous <Continuous>  diphtheria/tetanus/pertussis (acellular) Vaccine (ADAcel) 0.5 milliLiter(s) IntraMuscular once  ferrous    sulfate 325 milliGRAM(s) Oral daily  heparin  Injectable 5000 Unit(s) SubCutaneous every 12 hours  ibuprofen  Tablet. 600 milliGRAM(s) Oral every 6 hours  lactated ringers Bolus 1000 milliLiter(s) IV Bolus once  lactated ringers. 1000 milliLiter(s) (125 mL/Hr) IV Continuous <Continuous>  misoprostol Oral Solution 60 MICROGram(s) Oral every 2 hours  oxyCODONE    IR 5 milliGRAM(s) Oral every 3 hours  oxytocin Infusion 41.667 milliUNIT(s)/Min (125 mL/Hr) IV Continuous <Continuous>  oxytocin Infusion 333.333 milliUNIT(s)/Min (1000 mL/Hr) IV Continuous <Continuous>  prenatal multivitamin 1 Tablet(s) Oral daily      MEDICATIONS  (PRN):  diphenhydrAMINE 25 milliGRAM(s) Oral every 6 hours PRN Itching  docusate sodium 100 milliGRAM(s) Oral two times a day PRN Stool Softening  glycerin Suppository - Adult 1 Suppository(s) Rectal at bedtime PRN Constipation  lanolin Ointment 1 Application(s) Topical every 3 hours PRN Sore Nipples  oxyCODONE    IR 5 milliGRAM(s) Oral every 4 hours PRN Severe Pain (7 - 10)  senna 2 Tablet(s) Oral at bedtime PRN Constipation  simethicone 80 milliGRAM(s) Chew every 4 hours PRN Gas      Labs:  Blood type: A Positive  Rubella IgG: RPR: Negative                          10.8<L>   13.75<H> >-----------< 156    ( 10-28 @ 08:32 )             32.4<L>                        12.1   16.0<H> >-----------< 155    ( 10-27 @ 09:06 )             34.8                        13.6   10.9<H> >-----------< 179    ( 10-26 @ 11:23 )             37.9                  PE:  General: NAD  Abdomen: Soft, appropriately tender, incision c/d/i.  Extremities: No erythema, no pitting edema

## 2018-10-29 NOTE — PROGRESS NOTE ADULT - PROBLEM SELECTOR PLAN 1
- Continue regular diet.  - Increase ambulation.  - Continue motrin, tylenol, oxycodone PRN for pain control.     Marlin Ascencio PGY-1

## 2018-10-30 ENCOUNTER — TRANSCRIPTION ENCOUNTER (OUTPATIENT)
Age: 28
End: 2018-10-30

## 2018-10-30 VITALS
HEART RATE: 60 BPM | RESPIRATION RATE: 18 BRPM | OXYGEN SATURATION: 97 % | TEMPERATURE: 98 F | DIASTOLIC BLOOD PRESSURE: 88 MMHG | SYSTOLIC BLOOD PRESSURE: 129 MMHG

## 2018-10-30 LAB
BASOPHILS # BLD AUTO: 0.04 K/UL — SIGNIFICANT CHANGE UP (ref 0–0.2)
BASOPHILS NFR BLD AUTO: 0.5 % — SIGNIFICANT CHANGE UP (ref 0–2)
EOSINOPHIL # BLD AUTO: 0.37 K/UL — SIGNIFICANT CHANGE UP (ref 0–0.5)
EOSINOPHIL NFR BLD AUTO: 4.7 % — SIGNIFICANT CHANGE UP (ref 0–6)
HCT VFR BLD CALC: 32.5 % — LOW (ref 34.5–45)
HGB BLD-MCNC: 11.2 G/DL — LOW (ref 11.5–15.5)
IMM GRANULOCYTES NFR BLD AUTO: 0.8 % — SIGNIFICANT CHANGE UP (ref 0–1.5)
LYMPHOCYTES # BLD AUTO: 1.9 K/UL — SIGNIFICANT CHANGE UP (ref 1–3.3)
LYMPHOCYTES # BLD AUTO: 24.1 % — SIGNIFICANT CHANGE UP (ref 13–44)
MCHC RBC-ENTMCNC: 31.6 PG — SIGNIFICANT CHANGE UP (ref 27–34)
MCHC RBC-ENTMCNC: 34.5 GM/DL — SIGNIFICANT CHANGE UP (ref 32–36)
MCV RBC AUTO: 91.8 FL — SIGNIFICANT CHANGE UP (ref 80–100)
MONOCYTES # BLD AUTO: 0.45 K/UL — SIGNIFICANT CHANGE UP (ref 0–0.9)
MONOCYTES NFR BLD AUTO: 5.7 % — SIGNIFICANT CHANGE UP (ref 2–14)
NEUTROPHILS # BLD AUTO: 5.07 K/UL — SIGNIFICANT CHANGE UP (ref 1.8–7.4)
NEUTROPHILS NFR BLD AUTO: 64.2 % — SIGNIFICANT CHANGE UP (ref 43–77)
PLATELET # BLD AUTO: 193 K/UL — SIGNIFICANT CHANGE UP (ref 150–400)
RBC # BLD: 3.54 M/UL — LOW (ref 3.8–5.2)
RBC # FLD: 13.4 % — SIGNIFICANT CHANGE UP (ref 10.3–14.5)
WBC # BLD: 7.89 K/UL — SIGNIFICANT CHANGE UP (ref 3.8–10.5)
WBC # FLD AUTO: 7.89 K/UL — SIGNIFICANT CHANGE UP (ref 3.8–10.5)

## 2018-10-30 PROCEDURE — 86780 TREPONEMA PALLIDUM: CPT

## 2018-10-30 PROCEDURE — 86850 RBC ANTIBODY SCREEN: CPT

## 2018-10-30 PROCEDURE — 85027 COMPLETE CBC AUTOMATED: CPT

## 2018-10-30 PROCEDURE — 59025 FETAL NON-STRESS TEST: CPT

## 2018-10-30 PROCEDURE — 86900 BLOOD TYPING SEROLOGIC ABO: CPT

## 2018-10-30 PROCEDURE — 86901 BLOOD TYPING SEROLOGIC RH(D): CPT

## 2018-10-30 PROCEDURE — G0463: CPT

## 2018-10-30 PROCEDURE — 93005 ELECTROCARDIOGRAM TRACING: CPT

## 2018-10-30 PROCEDURE — 59050 FETAL MONITOR W/REPORT: CPT

## 2018-10-30 RX ORDER — IBUPROFEN 200 MG
1 TABLET ORAL
Qty: 0 | Refills: 0 | DISCHARGE
Start: 2018-10-30

## 2018-10-30 RX ORDER — ACETAMINOPHEN 500 MG
3 TABLET ORAL
Qty: 0 | Refills: 0 | DISCHARGE
Start: 2018-10-30

## 2018-10-30 RX ADMIN — Medication 600 MILLIGRAM(S): at 00:23

## 2018-10-30 RX ADMIN — Medication 975 MILLIGRAM(S): at 07:00

## 2018-10-30 RX ADMIN — Medication 600 MILLIGRAM(S): at 11:16

## 2018-10-30 RX ADMIN — HEPARIN SODIUM 5000 UNIT(S): 5000 INJECTION INTRAVENOUS; SUBCUTANEOUS at 06:03

## 2018-10-30 RX ADMIN — Medication 600 MILLIGRAM(S): at 10:40

## 2018-10-30 RX ADMIN — Medication 975 MILLIGRAM(S): at 06:03

## 2018-10-30 NOTE — DISCHARGE NOTE OB - MATERIALS PROVIDED
[facility] [place] Screening Program/Bottle Feeding Log/Guide to Postpartum Care/Back To Sleep Handout/[place]  Immunization Record

## 2018-10-30 NOTE — PROGRESS NOTE ADULT - SUBJECTIVE AND OBJECTIVE BOX
OB Postpartum Note: Primary  Delivery, POD#3    S: 27yo  POD#3 s/p pLTCS for arrest. The patient feels well.  Pain is well controlled. She is tolerating a regular diet and passing flatus. She is voiding spontaneously, and ambulating without difficulty. Denies CP/SOB. Denies lightheadedness/dizziness. Denies N/V.    O:  Vitals:  Vital Signs Last 24 Hrs  T(C): 36.8 (30 Oct 2018 06:18), Max: 37.1 (29 Oct 2018 21:42)  T(F): 98.2 (30 Oct 2018 06:18), Max: 98.8 (29 Oct 2018 21:42)  HR: 57 (30 Oct 2018 06:18) (57 - 76)  BP: 139/86 (30 Oct 2018 06:18) (119/79 - 139/86)  BP(mean): --  RR: 18 (30 Oct 2018 06:18) (18 - 18)  SpO2: 95% (30 Oct 2018 06:18) (95% - 95%)    MEDICATIONS  (STANDING):  acetaminophen   Tablet .. 975 milliGRAM(s) Oral every 6 hours  dextrose 5% + lactated ringers. 500 milliLiter(s) (250 mL/Hr) IV Continuous <Continuous>  diphtheria/tetanus/pertussis (acellular) Vaccine (ADAcel) 0.5 milliLiter(s) IntraMuscular once  ferrous    sulfate 325 milliGRAM(s) Oral daily  heparin  Injectable 5000 Unit(s) SubCutaneous every 12 hours  ibuprofen  Tablet. 600 milliGRAM(s) Oral every 6 hours  lactated ringers Bolus 1000 milliLiter(s) IV Bolus once  lactated ringers. 1000 milliLiter(s) (125 mL/Hr) IV Continuous <Continuous>  misoprostol Oral Solution 60 MICROGram(s) Oral every 2 hours  oxyCODONE    IR 5 milliGRAM(s) Oral every 3 hours  oxytocin Infusion 41.667 milliUNIT(s)/Min (125 mL/Hr) IV Continuous <Continuous>  oxytocin Infusion 333.333 milliUNIT(s)/Min (1000 mL/Hr) IV Continuous <Continuous>  prenatal multivitamin 1 Tablet(s) Oral daily    MEDICATIONS  (PRN):  diphenhydrAMINE 25 milliGRAM(s) Oral every 6 hours PRN Itching  docusate sodium 100 milliGRAM(s) Oral two times a day PRN Stool Softening  glycerin Suppository - Adult 1 Suppository(s) Rectal at bedtime PRN Constipation  lanolin Ointment 1 Application(s) Topical every 3 hours PRN Sore Nipples  oxyCODONE    IR 5 milliGRAM(s) Oral every 4 hours PRN Severe Pain (7 - 10)  senna 2 Tablet(s) Oral at bedtime PRN Constipation  simethicone 80 milliGRAM(s) Chew every 4 hours PRN Gas      LABS:  Blood type: A Positive  Rubella IgG: RPR: Negative                          10.8<L>   13.75<H> >-----------< 156    ( 10-28 @ 08:32 )             32.4<L>                        12.1   16.0<H> >-----------< 155    ( 10-27 @ 09:06 )             34.8                  Physical exam:  Gen: NAD  Abdomen: Soft, nontender, no distension , firm uterine fundus at umbilicus.  Incision: Clean, dry, and intact   Pelvic: Normal lochia noted  Ext: No calf tenderness

## 2018-10-30 NOTE — PROGRESS NOTE ADULT - ATTENDING COMMENTS
Patient seen and examined by me.   Agree with above assessment and plan.  Continue with current care. Patient seen and examined by me.   Agree with above assessment and plan.  Continue with current care.  Discharge home.

## 2018-10-30 NOTE — DISCHARGE NOTE OB - CARE PROVIDER_API CALL
Max Truong (MD), Obstetrics  Gynecology  [address] [facility] [place] [place] [address]  Phone: [phone]  Fax: [phone]

## 2018-10-30 NOTE — DISCHARGE NOTE OB - PATIENT PORTAL LINK FT
You can access the [facility] Patient Portal, offered by [facility], by registering with the following website: [email or web address]

## 2018-10-30 NOTE — DISCHARGE NOTE OB - CARE PLAN
Principal Discharge DX:	 delivery delivered  Goal:	full recovery  Assessment and plan of treatment:	as tolerated

## 2019-10-20 NOTE — PROGRESS NOTE ADULT - PROBLEM SELECTOR PLAN 1
- Continue motrin, tylenol, oxycodone PRN for pain control.  - Increase ambulation  - Continue regular diet  - Discharge planning  Marlin Ascencio PGY-1 head injury

## 2020-03-04 DIAGNOSIS — Z33.2 ENCOUNTER FOR ELECTIVE TERMINATION OF PREGNANCY: ICD-10-CM

## 2020-03-13 ENCOUNTER — APPOINTMENT (OUTPATIENT)
Dept: OBGYN | Facility: CLINIC | Age: 30
End: 2020-03-13

## 2021-03-24 NOTE — PATIENT PROFILE OB - PRO CORD BLOOD BANK YN OB
During cystoscopy the following was utilized on patient with no adverse affects:    45% SODIUM CHLORIDE 500 ML BAG  Lot number: [id number]  Expiration date: Feb22      LIDOCAINE HYDROCHLORIDE JELLY 2%   Lot number: [id number]  Expiration date: 9-22 no

## 2021-04-30 ENCOUNTER — OUTPATIENT (OUTPATIENT)
Dept: OUTPATIENT SERVICES | Facility: HOSPITAL | Age: 31
LOS: 1 days | End: 2021-04-30
Payer: COMMERCIAL

## 2021-04-30 VITALS
WEIGHT: 182.1 LBS | SYSTOLIC BLOOD PRESSURE: 124 MMHG | RESPIRATION RATE: 16 BRPM | TEMPERATURE: 98 F | HEART RATE: 75 BPM | HEIGHT: 62 IN | OXYGEN SATURATION: 96 % | DIASTOLIC BLOOD PRESSURE: 78 MMHG

## 2021-04-30 DIAGNOSIS — Z98.891 HISTORY OF UTERINE SCAR FROM PREVIOUS SURGERY: ICD-10-CM

## 2021-04-30 DIAGNOSIS — K61.0 ANAL ABSCESS: Chronic | ICD-10-CM

## 2021-04-30 DIAGNOSIS — Z96.22 MYRINGOTOMY TUBE(S) STATUS: Chronic | ICD-10-CM

## 2021-04-30 DIAGNOSIS — O34.211 MATERNAL CARE FOR LOW TRANSVERSE SCAR FROM PREVIOUS CESAREAN DELIVERY: ICD-10-CM

## 2021-04-30 DIAGNOSIS — Z01.818 ENCOUNTER FOR OTHER PREPROCEDURAL EXAMINATION: ICD-10-CM

## 2021-04-30 LAB
ANION GAP SERPL CALC-SCNC: 13 MMOL/L — SIGNIFICANT CHANGE UP (ref 5–17)
BLD GP AB SCN SERPL QL: NEGATIVE — SIGNIFICANT CHANGE UP
BUN SERPL-MCNC: 8 MG/DL — SIGNIFICANT CHANGE UP (ref 7–23)
CALCIUM SERPL-MCNC: 8.6 MG/DL — SIGNIFICANT CHANGE UP (ref 8.4–10.5)
CHLORIDE SERPL-SCNC: 102 MMOL/L — SIGNIFICANT CHANGE UP (ref 96–108)
CO2 SERPL-SCNC: 19 MMOL/L — LOW (ref 22–31)
CREAT SERPL-MCNC: 0.56 MG/DL — SIGNIFICANT CHANGE UP (ref 0.5–1.3)
GLUCOSE SERPL-MCNC: 83 MG/DL — SIGNIFICANT CHANGE UP (ref 70–99)
HCT VFR BLD CALC: 36.5 % — SIGNIFICANT CHANGE UP (ref 34.5–45)
HGB BLD-MCNC: 12.5 G/DL — SIGNIFICANT CHANGE UP (ref 11.5–15.5)
MCHC RBC-ENTMCNC: 31.1 PG — SIGNIFICANT CHANGE UP (ref 27–34)
MCHC RBC-ENTMCNC: 34.2 GM/DL — SIGNIFICANT CHANGE UP (ref 32–36)
MCV RBC AUTO: 90.8 FL — SIGNIFICANT CHANGE UP (ref 80–100)
NRBC # BLD: 0 /100 WBCS — SIGNIFICANT CHANGE UP (ref 0–0)
PLATELET # BLD AUTO: 185 K/UL — SIGNIFICANT CHANGE UP (ref 150–400)
POTASSIUM SERPL-MCNC: 3.9 MMOL/L — SIGNIFICANT CHANGE UP (ref 3.5–5.3)
POTASSIUM SERPL-SCNC: 3.9 MMOL/L — SIGNIFICANT CHANGE UP (ref 3.5–5.3)
RBC # BLD: 4.02 M/UL — SIGNIFICANT CHANGE UP (ref 3.8–5.2)
RBC # FLD: 12.9 % — SIGNIFICANT CHANGE UP (ref 10.3–14.5)
RH IG SCN BLD-IMP: POSITIVE — SIGNIFICANT CHANGE UP
SODIUM SERPL-SCNC: 134 MMOL/L — LOW (ref 135–145)
WBC # BLD: 10.53 K/UL — HIGH (ref 3.8–10.5)
WBC # FLD AUTO: 10.53 K/UL — HIGH (ref 3.8–10.5)

## 2021-04-30 PROCEDURE — G0463: CPT

## 2021-04-30 PROCEDURE — 80048 BASIC METABOLIC PNL TOTAL CA: CPT

## 2021-04-30 PROCEDURE — 86850 RBC ANTIBODY SCREEN: CPT

## 2021-04-30 PROCEDURE — 86901 BLOOD TYPING SEROLOGIC RH(D): CPT

## 2021-04-30 PROCEDURE — 86900 BLOOD TYPING SEROLOGIC ABO: CPT

## 2021-04-30 PROCEDURE — 85027 COMPLETE CBC AUTOMATED: CPT

## 2021-04-30 RX ORDER — OXYTOCIN 10 UNIT/ML
333.33 VIAL (ML) INJECTION
Qty: 20 | Refills: 0 | Status: DISCONTINUED | OUTPATIENT
Start: 2021-05-10 | End: 2021-05-12

## 2021-04-30 RX ORDER — CHLORHEXIDINE GLUCONATE 213 G/1000ML
1 SOLUTION TOPICAL ONCE
Refills: 0 | Status: DISCONTINUED | OUTPATIENT
Start: 2021-05-10 | End: 2021-05-12

## 2021-04-30 NOTE — OB PST NOTE - FAMILY HISTORY
Aunt  Still living? Unknown  Family history of stroke, Age at diagnosis: Age Unknown  Family history of uterine cancer, Age at diagnosis: Age Unknown     Grandparent  Still living? Unknown  Family history of breast cancer, Age at diagnosis: Age Unknown  Family history of hypertension, Age at diagnosis: Age Unknown

## 2021-04-30 NOTE — OB PST NOTE - HISTORY OF PRESENT ILLNESS
31 year old pregnant female with no significant PMH.  PSH significant for ear tubes as child, perianal abscess, and  Section in 2018. EDC: 21;  . She is scheduled for a repeat  and tubal ligation on 5/10/21 with Dr. Jo Ann Nichols.  She denies any signs or symptoms of  labor.  She denies recent known COVID exposure, fever, chills, malaise, fatigue, diarrhea, n/v, abdominal pain, cough, SOB, chest pain, palpitations, sore throat, headache, nasal congestion, rhinnorhea, and change in taste/smell.    COVID swab scheduled for 21 at [facility]. She was given numbers to reschedule closer to home ([place]) site for 3 days prior to her surgery.

## 2021-04-30 NOTE — OB PST NOTE - ANESTHESIA, PREVIOUS REACTION, PROFILE
with prior epidural (1st pregnancy) epidural migrated up and she was experiencing difficulty breathing

## 2021-04-30 NOTE — OB PST NOTE - NS_OBGYNHISTORY_OBGYN_ALL_OB_FT
Previous  Section in 2018.  EDC: 21; .  She is scheduled for a repeat  and tubal ligation on 5/10/21. No signs or symptoms of  labor.  No fetal anomalies reported.  No preclampsia or gestational diabetes reported.

## 2021-04-30 NOTE — OB PST NOTE - PROBLEM SELECTOR PLAN 1
-Scheduled for a repeat  and Tubal Ligation on 5/10/21 with Dr. JoA nn Nichols.  -CBC, BMP, T&S today  -[facility] (she is going to try to move her appt to 3 days prior at the [place] location, closer to home).  -Preop instructions provided and patient stated understanding.  -surgical scrub provided, along with directions for use  -Gatorade instructions provided as well.  -Ample time was provided for patient to ask questions and have them answered.  -No oral medication am DOS

## 2021-04-30 NOTE — OB PST NOTE - NSHPPHYSICALEXAM_GEN_ALL_CORE
Physical Exam:  · Constitutional: well-developed; well-groomed; well-nourished; no distress  · Eyes: PERRL; conjunctiva clear  · ENMT: No oral lesions; no gross abnormalities  · Neck: supple; no JVD  · Breasts: No deformity or limitation of movement   · Respiratory: airway patent; breath sounds equal; good air movement; respirations non-labored; clear to auscultation bilaterally  · Cardiovascular: regular rate and rhythm   · Gastrointestinal: bowel sounds normal; no guarding  · Genitourinary: patient refused   · Rectal: patient refused   · Extremities: no clubbing; no cyanosis; no pedal edema  · Vascular: Equal and normal pulses (carotid, femoral, dorsalis pedis)   · Neurological: alert and oriented x 3  · Gait/Balance: gait steady  · Skin: warm and dry; color normal  · Lymph Nodes:  posterior cervical L; posterior cervical R; anterior cervical L; anterior cervical R; supraclavicular L; supraclavicular R  · Posterior Cervical L	normal  · Posterior Cervical R	normal  · Anterior Cervical L	normal  · Anterior Cervical R	normal  · Supraclavicular L	normal  · Supraclavicular R	normal  · Musculoskeletal: normal   · Psychiatric: normal affect; normal behavior  gravid uterus denies s/s of active labor

## 2021-04-30 NOTE — OB PST NOTE - HEART RATE (BEATS/MIN)
Order faxed to [facility]   75

## 2021-05-04 PROBLEM — Z78.9 OTHER SPECIFIED HEALTH STATUS: Chronic | Status: ACTIVE | Noted: 2021-04-30

## 2021-05-07 ENCOUNTER — APPOINTMENT (OUTPATIENT)
Dept: DISASTER EMERGENCY | Facility: CLINIC | Age: 31
End: 2021-05-07

## 2021-05-07 DIAGNOSIS — Z01.818 ENCOUNTER FOR OTHER PREPROCEDURAL EXAMINATION: ICD-10-CM

## 2021-05-08 LAB — SARS-COV-2 N GENE NPH QL NAA+PROBE: NOT DETECTED

## 2021-05-09 ENCOUNTER — TRANSCRIPTION ENCOUNTER (OUTPATIENT)
Age: 31
End: 2021-05-09

## 2021-05-10 ENCOUNTER — INPATIENT (INPATIENT)
Facility: HOSPITAL | Age: 31
LOS: 1 days | Discharge: HOME | End: 2021-05-12
Attending: OBSTETRICS & GYNECOLOGY | Admitting: OBSTETRICS & GYNECOLOGY
Payer: COMMERCIAL

## 2021-05-10 VITALS — HEART RATE: 90 BPM | SYSTOLIC BLOOD PRESSURE: 132 MMHG | DIASTOLIC BLOOD PRESSURE: 77 MMHG

## 2021-05-10 DIAGNOSIS — K61.0 ANAL ABSCESS: Chronic | ICD-10-CM

## 2021-05-10 DIAGNOSIS — Z01.818 ENCOUNTER FOR OTHER PREPROCEDURAL EXAMINATION: ICD-10-CM

## 2021-05-10 DIAGNOSIS — Z96.22 MYRINGOTOMY TUBE(S) STATUS: Chronic | ICD-10-CM

## 2021-05-10 DIAGNOSIS — O34.211 MATERNAL CARE FOR LOW TRANSVERSE SCAR FROM PREVIOUS CESAREAN DELIVERY: ICD-10-CM

## 2021-05-10 LAB
BLD GP AB SCN SERPL QL: NEGATIVE — SIGNIFICANT CHANGE UP
COVID-19 SPIKE DOMAIN AB INTERP: POSITIVE
COVID-19 SPIKE DOMAIN ANTIBODY RESULT: >250 U/ML — HIGH
RH IG SCN BLD-IMP: POSITIVE — SIGNIFICANT CHANGE UP
SARS-COV-2 IGG+IGM SERPL QL IA: >250 U/ML — HIGH
SARS-COV-2 IGG+IGM SERPL QL IA: POSITIVE
T PALLIDUM AB TITR SER: NEGATIVE — SIGNIFICANT CHANGE UP

## 2021-05-10 RX ORDER — NALOXONE HYDROCHLORIDE 4 MG/.1ML
0.1 SPRAY NASAL
Refills: 0 | Status: DISCONTINUED | OUTPATIENT
Start: 2021-05-10 | End: 2021-05-11

## 2021-05-10 RX ORDER — SIMETHICONE 80 MG/1
80 TABLET, CHEWABLE ORAL EVERY 4 HOURS
Refills: 0 | Status: DISCONTINUED | OUTPATIENT
Start: 2021-05-10 | End: 2021-05-12

## 2021-05-10 RX ORDER — HEPARIN SODIUM 5000 [USP'U]/ML
5000 INJECTION INTRAVENOUS; SUBCUTANEOUS EVERY 12 HOURS
Refills: 0 | Status: DISCONTINUED | OUTPATIENT
Start: 2021-05-10 | End: 2021-05-12

## 2021-05-10 RX ORDER — SODIUM CHLORIDE 9 MG/ML
1000 INJECTION, SOLUTION INTRAVENOUS
Refills: 0 | Status: DISCONTINUED | OUTPATIENT
Start: 2021-05-10 | End: 2021-05-12

## 2021-05-10 RX ORDER — NALBUPHINE HYDROCHLORIDE 10 MG/ML
2.5 INJECTION, SOLUTION INTRAMUSCULAR; INTRAVENOUS; SUBCUTANEOUS EVERY 6 HOURS
Refills: 0 | Status: DISCONTINUED | OUTPATIENT
Start: 2021-05-10 | End: 2021-05-11

## 2021-05-10 RX ORDER — SODIUM CHLORIDE 9 MG/ML
1000 INJECTION, SOLUTION INTRAVENOUS
Refills: 0 | Status: DISCONTINUED | OUTPATIENT
Start: 2021-05-10 | End: 2021-05-10

## 2021-05-10 RX ORDER — ACETAMINOPHEN 500 MG
1000 TABLET ORAL ONCE
Refills: 0 | Status: COMPLETED | OUTPATIENT
Start: 2021-05-10 | End: 2021-05-10

## 2021-05-10 RX ORDER — CITRIC ACID/SODIUM CITRATE 300-500 MG
15 SOLUTION, ORAL ORAL ONCE
Refills: 0 | Status: COMPLETED | OUTPATIENT
Start: 2021-05-10 | End: 2021-05-10

## 2021-05-10 RX ORDER — LANOLIN
1 OINTMENT (GRAM) TOPICAL EVERY 6 HOURS
Refills: 0 | Status: DISCONTINUED | OUTPATIENT
Start: 2021-05-10 | End: 2021-05-12

## 2021-05-10 RX ORDER — OXYCODONE HYDROCHLORIDE 5 MG/1
5 TABLET ORAL ONCE
Refills: 0 | Status: DISCONTINUED | OUTPATIENT
Start: 2021-05-10 | End: 2021-05-12

## 2021-05-10 RX ORDER — KETOROLAC TROMETHAMINE 30 MG/ML
30 SYRINGE (ML) INJECTION EVERY 6 HOURS
Refills: 0 | Status: COMPLETED | OUTPATIENT
Start: 2021-05-10 | End: 2021-05-12

## 2021-05-10 RX ORDER — DIPHENHYDRAMINE HCL 50 MG
25 CAPSULE ORAL EVERY 4 HOURS
Refills: 0 | Status: DISCONTINUED | OUTPATIENT
Start: 2021-05-10 | End: 2021-05-11

## 2021-05-10 RX ORDER — DIPHENHYDRAMINE HCL 50 MG
25 CAPSULE ORAL EVERY 6 HOURS
Refills: 0 | Status: DISCONTINUED | OUTPATIENT
Start: 2021-05-10 | End: 2021-05-12

## 2021-05-10 RX ORDER — OXYCODONE HYDROCHLORIDE 5 MG/1
5 TABLET ORAL
Refills: 0 | Status: DISCONTINUED | OUTPATIENT
Start: 2021-05-10 | End: 2021-05-12

## 2021-05-10 RX ORDER — ACETAMINOPHEN 500 MG
975 TABLET ORAL
Refills: 0 | Status: DISCONTINUED | OUTPATIENT
Start: 2021-05-10 | End: 2021-05-12

## 2021-05-10 RX ORDER — TETANUS TOXOID, REDUCED DIPHTHERIA TOXOID AND ACELLULAR PERTUSSIS VACCINE, ADSORBED 5; 2.5; 8; 8; 2.5 [IU]/.5ML; [IU]/.5ML; UG/.5ML; UG/.5ML; UG/.5ML
0.5 SUSPENSION INTRAMUSCULAR ONCE
Refills: 0 | Status: DISCONTINUED | OUTPATIENT
Start: 2021-05-10 | End: 2021-05-12

## 2021-05-10 RX ORDER — IBUPROFEN 200 MG
600 TABLET ORAL EVERY 6 HOURS
Refills: 0 | Status: COMPLETED | OUTPATIENT
Start: 2021-05-10 | End: 2022-04-08

## 2021-05-10 RX ORDER — OXYTOCIN 10 UNIT/ML
333.33 VIAL (ML) INJECTION
Qty: 20 | Refills: 0 | Status: DISCONTINUED | OUTPATIENT
Start: 2021-05-10 | End: 2021-05-12

## 2021-05-10 RX ORDER — MAGNESIUM HYDROXIDE 400 MG/1
30 TABLET, CHEWABLE ORAL
Refills: 0 | Status: DISCONTINUED | OUTPATIENT
Start: 2021-05-10 | End: 2021-05-12

## 2021-05-10 RX ORDER — FAMOTIDINE 10 MG/ML
20 INJECTION INTRAVENOUS ONCE
Refills: 0 | Status: COMPLETED | OUTPATIENT
Start: 2021-05-10 | End: 2021-05-10

## 2021-05-10 RX ORDER — MORPHINE SULFATE 50 MG/1
2 CAPSULE, EXTENDED RELEASE ORAL ONCE
Refills: 0 | Status: DISCONTINUED | OUTPATIENT
Start: 2021-05-10 | End: 2021-05-11

## 2021-05-10 RX ORDER — SODIUM CHLORIDE 9 MG/ML
1000 INJECTION, SOLUTION INTRAVENOUS ONCE
Refills: 0 | Status: DISCONTINUED | OUTPATIENT
Start: 2021-05-10 | End: 2021-05-10

## 2021-05-10 RX ORDER — ONDANSETRON 8 MG/1
4 TABLET, FILM COATED ORAL EVERY 6 HOURS
Refills: 0 | Status: DISCONTINUED | OUTPATIENT
Start: 2021-05-10 | End: 2021-05-11

## 2021-05-10 RX ORDER — CEFAZOLIN SODIUM 1 G
2000 VIAL (EA) INJECTION ONCE
Refills: 0 | Status: DISCONTINUED | OUTPATIENT
Start: 2021-05-10 | End: 2021-05-10

## 2021-05-10 RX ADMIN — Medication 975 MILLIGRAM(S): at 23:45

## 2021-05-10 RX ADMIN — FAMOTIDINE 20 MILLIGRAM(S): 10 INJECTION INTRAVENOUS at 11:25

## 2021-05-10 RX ADMIN — Medication 975 MILLIGRAM(S): at 23:01

## 2021-05-10 RX ADMIN — Medication 30 MILLIGRAM(S): at 19:52

## 2021-05-10 RX ADMIN — Medication 30 MILLIGRAM(S): at 20:30

## 2021-05-10 RX ADMIN — HEPARIN SODIUM 5000 UNIT(S): 5000 INJECTION INTRAVENOUS; SUBCUTANEOUS at 21:41

## 2021-05-10 RX ADMIN — Medication 15 MILLILITER(S): at 11:25

## 2021-05-10 RX ADMIN — Medication 400 MILLIGRAM(S): at 15:50

## 2021-05-10 NOTE — OB PROVIDER H&P - NSHPPHYSICALEXAM_GEN_ALL_CORE
Gen NAD  Vital Signs Last 24 Hrs  T(C): --  T(F): --  HR: 90 (10 May 2021 10:45) (82 - 111)  BP: 132/77 (10 May 2021 10:23) (132/77 - 132/77)  BP(mean): --  RR: --  SpO2: 97% (10 May 2021 10:45) (93% - 98%)    CV RRR  Lungs CTA B/L  Abd soft/NT, gravid  Ext soft, NT b/l  , Cat I  [place] occ ctx

## 2021-05-10 NOTE — OB PROVIDER H&P - ASSESSMENT
Admit to L&D  [facility]/[place]  Routine labs  IVFluids  NPO/Bicitra  Anesthesia c/s  For repeat C/sec and B/L salpingectomy  Dr. Nichols aware

## 2021-05-10 NOTE — OB RN INTRAOPERATIVE NOTE - NSSELHIDDEN_OBGYN_ALL_OB_FT
[NS_DeliveryAttending1_OBGYN_ALL_OB_FT:[id number]==] [NS_DeliveryAttending1_OBGYN_ALL_OB_FT:[id number]==],[NS_DeliveryRN_OBGYN_ALL_OB_FT:[id number]==] [NS_DeliveryAttending1_OBGYN_ALL_OB_FT:[id number]==],[NS_DeliveryRN_OBGYN_ALL_OB_FT:[id number]==],[NS_DeliveryAttending2_OBGYN_ALL_OB_FT:[id number]]

## 2021-05-10 NOTE — OB PROVIDER DELIVERY SUMMARY - NSSELHIDDEN_OBGYN_ALL_OB_FT
[NS_DeliveryAttending1_OBGYN_ALL_OB_FT:[id number]==],[NS_DeliveryRN_OBGYN_ALL_OB_FT:[id number]==]

## 2021-05-10 NOTE — OB PROVIDER H&P - HISTORY OF PRESENT ILLNESS
32yo  @ 39 1/7wks presents for scheduled rLTCS and B/L salpingectomy. (+) fetal movement. Denies ctx,  leakage of fluid or vaginal bleeding. PNC uncomplicated.   EFW 3600  GBS Neg  Ax: NKDA  MedHx: denies  Meds: none   OBHx: 2018 pLTCS, OP/FTD, Male 7#10oz  GynHx: Denies abnl paps/cysts/fibroids/endometriosis/HPV  SurgHx: c/sec, eustachian tubes  FamilyHx: MGM breast cancer  Pt accepts blood products

## 2021-05-10 NOTE — OB RN DELIVERY SUMMARY - NSSELHIDDEN_OBGYN_ALL_OB_FT
[NS_DeliveryAttending1_OBGYN_ALL_OB_FT:[id number]==],[NS_DeliveryRN_OBGYN_ALL_OB_FT:[id number]==]

## 2021-05-10 NOTE — OB PROVIDER DELIVERY SUMMARY - NSPROVIDERDELIVERYNOTE_OBGYN_ALL_OB_FT
Repeat  section, viable male infant.  Very dense adhesions of anterior serosal to anterior wall. . Unable to reach fundal area to do tubal ligation, Pt informed.  cmelgar Scheduled rLTCS. Dense adhesions of uterus to anterior abdominal wall. Uterus unable to be mobilized at the fundus. Adnexa unable to be visualized. Bladder back-filled with 250cc of methylene blue to demarcate the bladder margins for lysis of adhesions. Viable male infant deliver from vertex presentation. Clear fluid on amniotomy. APGARS 9,9. Weight 7#9. Hysterotomy repaired in running locked fashion. Hemostatic fig of 8 stitches thrown on raw surfaces where adhesions were taken down. Surgicel powder, davonte and fibrillar applied over hysterotomy repair and raw ant uterine surfaces with good hemostasis achieved. , IVF 2000, .       Repeat  section, viable male infant.  Very dense adhesions of anterior serosal to anterior wall. . Unable to reach fundal area to do tubal ligation, Pt informed.  cmelgar Scheduled rLTCS. Dense adhesions of uterus to anterior abdominal wall. Uterus unable to be mobilized at the fundus. Adnexa unable to be visualized. Bladder back-filled with 250cc of methylene blue to demarcate the bladder margins for lysis of adhesions. Viable male infant delivered from vertex presentation. Clear fluid on amniotomy. APGARS 9,9. Weight 7#9. Hysterotomy repaired in running locked fashion. Hemostatic fig of 8 stitches thrown on raw surfaces where adhesions were taken down. Surgicel powder, davonte and fibrillar applied over hysterotomy repair and raw ant uterine surfaces with good hemostasis achieved. , IVF 2000, .       Repeat  section, viable male infant.  Very dense adhesions of anterior serosal to anterior wall. . Unable to reach fundal area to do tubal ligation, Pt informed.  cmelgar

## 2021-05-10 NOTE — OB RN DELIVERY SUMMARY - NS_SEPSISRSKCALC_OBGYN_ALL_OB_FT
EOS calculated successfully. EOS Risk Factor: 0.5/1000 live births ([facility] national incidence); GA=39w1d; Temp=98.2; ROM=0.017; GBS='Unknown'; Antibiotics='No antibiotics or any antibiotics < 2 hrs prior to birth'

## 2021-05-10 NOTE — OB NEONATOLOGY/PEDIATRICIAN DELIVERY SUMMARY - NSPEDSNEONOTESA_OBGYN_ALL_OB_FT
JORDI: Baby is a 39.1 week GA M born to a 30y/o  mother via rCS. Maternal blood type A+. Maternal history unremarkable. Pregnancy uncomplicated. Prenatal labs negative, non-reactive, and immune. GBS unknown. ROM at delivery with clear fluids. Baby born vigorous and crying. Warmed, dried, stimulated, suctioned. Void x2. APGARs 9/9. EOS 0.03. Bottle-feeding. Consents Hep B vaccine. Yes to circ.

## 2021-05-11 LAB
BASOPHILS # BLD AUTO: 0.04 K/UL — SIGNIFICANT CHANGE UP (ref 0–0.2)
BASOPHILS NFR BLD AUTO: 0.2 % — SIGNIFICANT CHANGE UP (ref 0–2)
EOSINOPHIL # BLD AUTO: 0.02 K/UL — SIGNIFICANT CHANGE UP (ref 0–0.5)
EOSINOPHIL NFR BLD AUTO: 0.1 % — SIGNIFICANT CHANGE UP (ref 0–6)
HCT VFR BLD CALC: 34.7 % — SIGNIFICANT CHANGE UP (ref 34.5–45)
HGB BLD-MCNC: 11.7 G/DL — SIGNIFICANT CHANGE UP (ref 11.5–15.5)
IMM GRANULOCYTES NFR BLD AUTO: 0.7 % — SIGNIFICANT CHANGE UP (ref 0–1.5)
LYMPHOCYTES # BLD AUTO: 1.96 K/UL — SIGNIFICANT CHANGE UP (ref 1–3.3)
LYMPHOCYTES # BLD AUTO: 11.7 % — LOW (ref 13–44)
MCHC RBC-ENTMCNC: 31.4 PG — SIGNIFICANT CHANGE UP (ref 27–34)
MCHC RBC-ENTMCNC: 33.7 GM/DL — SIGNIFICANT CHANGE UP (ref 32–36)
MCV RBC AUTO: 93 FL — SIGNIFICANT CHANGE UP (ref 80–100)
MONOCYTES # BLD AUTO: 1.07 K/UL — HIGH (ref 0–0.9)
MONOCYTES NFR BLD AUTO: 6.4 % — SIGNIFICANT CHANGE UP (ref 2–14)
NEUTROPHILS # BLD AUTO: 13.55 K/UL — HIGH (ref 1.8–7.4)
NEUTROPHILS NFR BLD AUTO: 80.9 % — HIGH (ref 43–77)
NRBC # BLD: 0 /100 WBCS — SIGNIFICANT CHANGE UP (ref 0–0)
PLATELET # BLD AUTO: 172 K/UL — SIGNIFICANT CHANGE UP (ref 150–400)
RBC # BLD: 3.73 M/UL — LOW (ref 3.8–5.2)
RBC # FLD: 13.2 % — SIGNIFICANT CHANGE UP (ref 10.3–14.5)
WBC # BLD: 16.75 K/UL — HIGH (ref 3.8–10.5)
WBC # FLD AUTO: 16.75 K/UL — HIGH (ref 3.8–10.5)

## 2021-05-11 RX ORDER — IBUPROFEN 200 MG
600 TABLET ORAL EVERY 6 HOURS
Refills: 0 | Status: DISCONTINUED | OUTPATIENT
Start: 2021-05-11 | End: 2021-05-12

## 2021-05-11 RX ORDER — SENNA PLUS 8.6 MG/1
1 TABLET ORAL ONCE
Refills: 0 | Status: DISCONTINUED | OUTPATIENT
Start: 2021-05-11 | End: 2021-05-12

## 2021-05-11 RX ADMIN — Medication 975 MILLIGRAM(S): at 11:45

## 2021-05-11 RX ADMIN — Medication 30 MILLIGRAM(S): at 01:51

## 2021-05-11 RX ADMIN — Medication 30 MILLIGRAM(S): at 09:35

## 2021-05-11 RX ADMIN — Medication 975 MILLIGRAM(S): at 23:51

## 2021-05-11 RX ADMIN — Medication 600 MILLIGRAM(S): at 21:30

## 2021-05-11 RX ADMIN — Medication 975 MILLIGRAM(S): at 05:28

## 2021-05-11 RX ADMIN — Medication 600 MILLIGRAM(S): at 14:38

## 2021-05-11 RX ADMIN — Medication 30 MILLIGRAM(S): at 02:30

## 2021-05-11 RX ADMIN — Medication 975 MILLIGRAM(S): at 11:06

## 2021-05-11 RX ADMIN — Medication 975 MILLIGRAM(S): at 06:20

## 2021-05-11 RX ADMIN — HEPARIN SODIUM 5000 UNIT(S): 5000 INJECTION INTRAVENOUS; SUBCUTANEOUS at 08:44

## 2021-05-11 RX ADMIN — Medication 600 MILLIGRAM(S): at 20:47

## 2021-05-11 RX ADMIN — Medication 600 MILLIGRAM(S): at 15:30

## 2021-05-11 RX ADMIN — Medication 975 MILLIGRAM(S): at 18:00

## 2021-05-11 RX ADMIN — Medication 30 MILLIGRAM(S): at 08:44

## 2021-05-11 RX ADMIN — HEPARIN SODIUM 5000 UNIT(S): 5000 INJECTION INTRAVENOUS; SUBCUTANEOUS at 20:47

## 2021-05-11 RX ADMIN — Medication 975 MILLIGRAM(S): at 17:07

## 2021-05-11 NOTE — PROVIDER CONTACT NOTE (OTHER) - ACTION/TREATMENT ORDERED:
dr westbrook aware. pt to be bladder scanned and if showing residual urine perform straight catheterization per dr freeman.

## 2021-05-12 ENCOUNTER — TRANSCRIPTION ENCOUNTER (OUTPATIENT)
Age: 31
End: 2021-05-12

## 2021-05-12 VITALS
OXYGEN SATURATION: 97 % | DIASTOLIC BLOOD PRESSURE: 67 MMHG | TEMPERATURE: 98 F | RESPIRATION RATE: 18 BRPM | HEART RATE: 59 BPM | SYSTOLIC BLOOD PRESSURE: 109 MMHG

## 2021-05-12 PROCEDURE — 86850 RBC ANTIBODY SCREEN: CPT

## 2021-05-12 PROCEDURE — 86780 TREPONEMA PALLIDUM: CPT

## 2021-05-12 PROCEDURE — 86769 SARS-COV-2 COVID-19 ANTIBODY: CPT

## 2021-05-12 PROCEDURE — 85025 COMPLETE CBC W/AUTO DIFF WBC: CPT

## 2021-05-12 PROCEDURE — 86901 BLOOD TYPING SEROLOGIC RH(D): CPT

## 2021-05-12 PROCEDURE — C1889: CPT

## 2021-05-12 PROCEDURE — 59025 FETAL NON-STRESS TEST: CPT

## 2021-05-12 PROCEDURE — 86900 BLOOD TYPING SEROLOGIC ABO: CPT

## 2021-05-12 PROCEDURE — 59050 FETAL MONITOR W/REPORT: CPT

## 2021-05-12 RX ORDER — CALCIUM CARBONATE 500(1250)
1 TABLET ORAL
Qty: 0 | Refills: 0 | DISCHARGE

## 2021-05-12 RX ORDER — IBUPROFEN 200 MG
1 TABLET ORAL
Qty: 0 | Refills: 0 | DISCHARGE
Start: 2021-05-12

## 2021-05-12 RX ADMIN — Medication 975 MILLIGRAM(S): at 12:50

## 2021-05-12 RX ADMIN — Medication 975 MILLIGRAM(S): at 00:35

## 2021-05-12 RX ADMIN — Medication 600 MILLIGRAM(S): at 10:00

## 2021-05-12 RX ADMIN — Medication 975 MILLIGRAM(S): at 06:08

## 2021-05-12 RX ADMIN — Medication 600 MILLIGRAM(S): at 04:31

## 2021-05-12 RX ADMIN — HEPARIN SODIUM 5000 UNIT(S): 5000 INJECTION INTRAVENOUS; SUBCUTANEOUS at 08:06

## 2021-05-12 RX ADMIN — Medication 975 MILLIGRAM(S): at 06:52

## 2021-05-12 RX ADMIN — Medication 600 MILLIGRAM(S): at 03:51

## 2021-05-12 RX ADMIN — Medication 600 MILLIGRAM(S): at 09:18

## 2021-05-12 RX ADMIN — Medication 975 MILLIGRAM(S): at 11:47

## 2021-05-12 NOTE — PROGRESS NOTE ADULT - SUBJECTIVE AND OBJECTIVE BOX
Postpartum Note,  Section   ATTENDING NOTE - Post-operative day 1    Subjective:  The patient feels well. Ambulating without difficulty  Pt is tolerating regular diet. Pain well controlled.  She denies nausea and vomiting.     zuniga dc'd pt already voided    She reports normal postpartum bleeding    Physical exam:    Vital Signs Last 24 Hrs  T(C): 36.8 (11 May 2021 09:15), Max: 36.8 (10 May 2021 10:45)  T(F): 98.2 (11 May 2021 09:15), Max: 98.2 (10 May 2021 10:45)  HR: 60 (11 May 2021 09:15) (55 - 111)  BP: 99/62 (11 May 2021 09:15) (93/59 - 132/77)  BP(mean): 81 (10 May 2021 17:10) (77 - 86)  RR: 18 (11 May 2021 09:15) (16 - 20)  SpO2: 97% (11 May 2021 09:15) (93% - 99%)    Gen: NAD  Breast: Soft, nontender, not engorged.  Abdomen: Soft, nontender, no distension , firm uterine fundus at umbilicus.  Incision: Clean, dry, and intact  Pelvic: Normal lochia noted  Ext: No calf tenderness, no hyper reflexia       LABS:                            11.7   16.75 )-----------( 172      ( 11 May 2021 07:28 )             34.7     ABO Interpretation: A (05-10 @ 11:15)  Rh Interpretation: Positive (0510 @ 11:15)  Antibody Screen: Negative (05-10 @ 11:15)                Allergies    No Known Allergies    Intolerances      MEDICATIONS  (STANDING):  acetaminophen   Tablet .. 975 milliGRAM(s) Oral <User Schedule>  chlorhexidine 2% Cloths 1 Application(s) Topical once  diphtheria/tetanus/pertussis (acellular) Vaccine (ADAcel) 0.5 milliLiter(s) IntraMuscular once  heparin   Injectable 5000 Unit(s) SubCutaneous every 12 hours  ibuprofen  Tablet. 600 milliGRAM(s) Oral every 6 hours  ketorolac   Injectable 30 milliGRAM(s) IV Push every 6 hours  lactated ringers. 1000 milliLiter(s) (125 mL/Hr) IV Continuous <Continuous>  morphine PF Epidural 2 milliGRAM(s) Epidural once  oxytocin Infusion 333.333 milliUNIT(s)/Min (1000 mL/Hr) IV Continuous <Continuous>  oxytocin Infusion 333.333 milliUNIT(s)/Min (1000 mL/Hr) IV Continuous <Continuous>    MEDICATIONS  (PRN):  diphenhydrAMINE 25 milliGRAM(s) Oral every 4 hours PRN Pruritus  diphenhydrAMINE 25 milliGRAM(s) Oral every 6 hours PRN Pruritus  lanolin Ointment 1 Application(s) Topical every 6 hours PRN Sore Nipples  magnesium hydroxide Suspension 30 milliLiter(s) Oral two times a day PRN Constipation  nalbuphine Injectable 2.5 milliGRAM(s) IV Push every 6 hours PRN Pruritus  naloxone Injectable 0.1 milliGRAM(s) IV Push every 3 minutes PRN For ANY of the following changes in patient status:  A. RR LESS THAN 10 breaths per minute, B. Oxygen saturation LESS THAN 90%, C. Sedation score of 6  ondansetron Injectable 4 milliGRAM(s) IV Push every 6 hours PRN Nausea  oxyCODONE    IR 5 milliGRAM(s) Oral every 3 hours PRN Moderate to Severe Pain (4-10)  oxyCODONE    IR 5 milliGRAM(s) Oral once PRN Moderate to Severe Pain (4-10)  simethicone 80 milliGRAM(s) Chew every 4 hours PRN Gas        Assessment and Plan  POD # 1  s/p  section. Stable.  Encourage ambulation  Continue with PCEA/PCA  Regular diet as tolerated  Follow up CBC            
Postpartum Note-  Section POD#1    Allergies  No Known Allergies    Intolerances  Denies    Blood Type  A  --  Positive  Rubella Immune  RPR Neg    S: Patient is a  31y  POD#1 S/P repeat C/Sec and b/l salpingectomy  Patient w/o complaints, pain is controlled.  Pt is OOB, tolerating PO. Patient denies passing flatus. Lochia WNL.   Feeding: Breast and bottle feeding    O:  Vital Signs Last 24 Hrs  T(C): 36.6 (11 May 2021 04:56), Max: 36.8 (10 May 2021 10:45)  T(F): 97.8 (11 May 2021 04:56), Max: 98.2 (10 May 2021 10:45)  HR: 60 (11 May 2021 04:56) (55 - 111)  BP: 93/59 (11 May 2021 04:56) (93/59 - 132/77)  BP(mean): 81 (10 May 2021 17:10) (77 - 86)  RR: 18 (11 May 2021 04:56) (16 - 20)  SpO2: 98% (11 May 2021 04:56) (93% - 99%)  I&O's Summary    10 May 2021 07:01  -  11 May 2021 07:00  --------------------------------------------------------  IN: 3000 mL / OUT: 2000 mL / NET: 1000 mL        Gen: NAD  CV: rrr s1s2, CTABL  Abdomen: Soft, nontender, non-distended, fundus firm.  Bowel sounds x 4 quadrants  Incision: Clean, dry, and intact.  Negative erythema/edema/ecchymosis   Sub Q  Lochia WNL  Ext: PAS in place. Negative Homans B/L.  Pedal pulses palpated B/L    LABS:        A/P:  31y  POD # 1 S/P  repeat  section, doing well    PMHx:   delivery delivered 2018  Perianal abscess  History of placement of ear tubes as child    Current Issues:  None    Increase OOB  DVT ppx  Dressing removed  Regular diet  AM CBC  Routine Postpartum/Post-op care    Yolanda Greenfield PA-C      
Day 1 of Anesthesia Pain Management Service    SUBJECTIVE: Doing ok  Pain Scale Score:          [X] Refer to charted pain scores    THERAPY:  s/p   2  mg PF epidural morphine on 5\10\2021      MEDICATIONS  (STANDING):  acetaminophen   Tablet .. 975 milliGRAM(s) Oral <User Schedule>  chlorhexidine 2% Cloths 1 Application(s) Topical once  diphtheria/tetanus/pertussis (acellular) Vaccine (ADAcel) 0.5 milliLiter(s) IntraMuscular once  heparin   Injectable 5000 Unit(s) SubCutaneous every 12 hours  ibuprofen  Tablet. 600 milliGRAM(s) Oral every 6 hours  ketorolac   Injectable 30 milliGRAM(s) IV Push every 6 hours  lactated ringers. 1000 milliLiter(s) (125 mL/Hr) IV Continuous <Continuous>  morphine PF Epidural 2 milliGRAM(s) Epidural once  oxytocin Infusion 333.333 milliUNIT(s)/Min (1000 mL/Hr) IV Continuous <Continuous>  oxytocin Infusion 333.333 milliUNIT(s)/Min (1000 mL/Hr) IV Continuous <Continuous>    MEDICATIONS  (PRN):  diphenhydrAMINE 25 milliGRAM(s) Oral every 4 hours PRN Pruritus  diphenhydrAMINE 25 milliGRAM(s) Oral every 6 hours PRN Pruritus  lanolin Ointment 1 Application(s) Topical every 6 hours PRN Sore Nipples  magnesium hydroxide Suspension 30 milliLiter(s) Oral two times a day PRN Constipation  nalbuphine Injectable 2.5 milliGRAM(s) IV Push every 6 hours PRN Pruritus  naloxone Injectable 0.1 milliGRAM(s) IV Push every 3 minutes PRN For ANY of the following changes in patient status:  A. RR LESS THAN 10 breaths per minute, B. Oxygen saturation LESS THAN 90%, C. Sedation score of 6  ondansetron Injectable 4 milliGRAM(s) IV Push every 6 hours PRN Nausea  oxyCODONE    IR 5 milliGRAM(s) Oral every 3 hours PRN Moderate to Severe Pain (4-10)  oxyCODONE    IR 5 milliGRAM(s) Oral once PRN Moderate to Severe Pain (4-10)  simethicone 80 milliGRAM(s) Chew every 4 hours PRN Gas      OBJECTIVE:    Sedation:        	[X] Alert	 [ ] Drowsy	[ ] Arousable      [ ] Asleep       [ ] Unresponsive    Side Effects:	[ ] None 	[ ] Nausea	[ ] Vomiting         [ ] Pruritus  		[ ] Weakness            [ ] Numbness	          [x ] Other: urinary retention    Vital Signs Last 24 Hrs  T(C): 36.8 (11 May 2021 09:15), Max: 36.8 (10 May 2021 10:45)  T(F): 98.2 (11 May 2021 09:15), Max: 98.2 (10 May 2021 10:45)  HR: 60 (11 May 2021 09:15) (55 - 111)  BP: 99/62 (11 May 2021 09:15) (93/59 - 132/77)  BP(mean): 81 (10 May 2021 17:10) (77 - 86)  RR: 18 (11 May 2021 09:15) (16 - 20)  SpO2: 97% (11 May 2021 09:15) (93% - 99%)    ASSESSMENT/ PLAN  [X] Patient to be transitioned to prn analgesics later today  [X] Pain management per primary service, pain service to sign off   [X]Documentation and Verification of current medications
Pain Management Attending Addendum    SUBJECTIVE:    Therapy:	  PCA	   Epidural           s/p Spinal Opoid x             Postpartum infusion	  Patient controlled regional anesthesia (PCRA)    prn Analgesics    OBJECTIVE: No new signs   x   Other:    Side Effects:    Nonex			 Other:    Assessment of Catheter Site:		 Intact		 Other:    ASSESSMENT/PLAN  Continue current therapy    Therapy changed to:     IV PCA        Epidural      prn Analgesics x    post partum infusion    Comments:
Postpartum Note -  Section POD #2    S: Patient is a 32 y/o  POD#2 s/p repeat  section. Patient is without complaints and pain is well controlled. Pt is OOB, tolerating PO, and passing flatus.  Lochia is WNL. Patient is breast and bottle feeding.    O:  Vital Signs Last 24 Hrs  T(C): 36.7 (12 May 2021 05:25), Max: 36.8 (11 May 2021 09:15)  T(F): 98 (12 May 2021 05:25), Max: 98.2 (11 May 2021 09:15)  HR: 59 (12 May 2021 05:25) (59 - 73)  BP: 109/67 (12 May 2021 05:25) (91/60 - 109/67)  BP(mean): --  RR: 18 (12 May 2021 05:25) (18 - 18)  SpO2: 97% (12 May 2021 05:25) (97% - 99%)    PE:  General: well appearing, well nourished, in no acute distress  Neuro: AOx3  Cardiovascular: S1, S2. Regular rate and rhythm. No murmurs, rubs, or gallops  Pulm: Clear to auscultation bilaterally  Abdomen: soft, non-tender, non-distended, fundus firm. Bowel sound in all 4 quadrants.   Incision: Clean, dry, and intact. No erythema, edema, or ecchymosis. SubQ sutures.  Gyn: Lochia WNL  Ext: Negative Homans b/l. No tenderness to palpation of calves.

## 2021-05-12 NOTE — PROGRESS NOTE ADULT - ASSESSMENT
30 y/o  POD#2 s/p repeat  section, doing well    Plan:  -Regular diet  -OOB  -Routine postpartum/post-op care    BERTA LoeraS
31y  POD # 1 S/P  repeat  section, doing well    PMHx:   delivery delivered 2018  Perianal abscess  History of placement of ear tubes as child    Current Issues:  None

## 2021-05-12 NOTE — DISCHARGE NOTE OB - CARE PROVIDER_API CALL
Jo Ann Nichols)  Obstetrics and Gynecology  [address] [place] [address]  Phone: [phone]  Fax: [phone]  Follow Up Time:

## 2021-05-12 NOTE — DISCHARGE NOTE OB - PATIENT PORTAL LINK FT
You can access the FollowMyHealth Patient Portal offered by [facility] by registering at the following website: [email or web address]. By joining [employer]’s FollowMyHealth portal, you will also be able to view your health information using other applications (apps) compatible with our system.

## 2021-05-12 NOTE — DISCHARGE NOTE OB - CARE PLAN
Principal Discharge DX:	 delivery delivered  Goal:	postpartum recovery  Assessment and plan of treatment:	full instructions given  rto 2 weeks or as necessary

## 2022-04-09 ENCOUNTER — TRANSCRIPTION ENCOUNTER (OUTPATIENT)
Age: 32
End: 2022-04-09

## 2022-04-25 ENCOUNTER — TRANSCRIPTION ENCOUNTER (OUTPATIENT)
Age: 32
End: 2022-04-25

## 2022-12-05 ENCOUNTER — EMERGENCY (EMERGENCY)
Facility: HOSPITAL | Age: 32
LOS: 1 days | Discharge: HOME | End: 2022-12-05
Admitting: EMERGENCY MEDICINE

## 2022-12-05 VITALS
RESPIRATION RATE: 16 BRPM | DIASTOLIC BLOOD PRESSURE: 74 MMHG | SYSTOLIC BLOOD PRESSURE: 134 MMHG | TEMPERATURE: 98 F | HEART RATE: 85 BPM

## 2022-12-05 DIAGNOSIS — Z96.22 MYRINGOTOMY TUBE(S) STATUS: Chronic | ICD-10-CM

## 2022-12-05 DIAGNOSIS — K61.0 ANAL ABSCESS: Chronic | ICD-10-CM

## 2022-12-05 PROCEDURE — 99284 EMERGENCY DEPT VISIT MOD MDM: CPT

## 2022-12-05 RX ORDER — HYDROXYZINE HCL 10 MG
1 TABLET ORAL
Qty: 12 | Refills: 0
Start: 2022-12-05 | End: 2022-12-07

## 2022-12-05 RX ADMIN — Medication 2 MILLIGRAM(S): at 22:45

## 2022-12-05 NOTE — ED PROVIDER NOTE - CLINICAL SUMMARY MEDICAL DECISION MAKING FREE TEXT BOX
This is a32 yr old F, no pertinent pmh with the c/o increased anxiety, and unable to sleep for the last couple of days. PT arrived with  Leobardo ( [phone]). 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 She feels very emotional, anxious, and at times irritable and she can not sleep makes her situation worst. She lives with her  and 2 children, 4 and 1 yr old. She would like to have resources and medication for sleep. Explicitly denies si,hi,ah,vh, any previous psychiatric tx and hospitalization, any delusions.  Collateral info obtained by provider from  via phone, who reports as follows. Today his wife had a little melt down, she is going through some stress at work and it effecting her sleep and emotions. He brought her in for resources, possible therapy and some sleeping aid. Does not have any safety concerns.   There is no clinical evidence of intoxication, or any acute medical problem requiring immediate intervention. At present time pat in not a harm to self or others and can be safely discharge to follow up with out patient psychiatrist.

## 2022-12-05 NOTE — ED PROVIDER NOTE - NSICDXFAMILYHX_GEN_ALL_CORE_FT
FAMILY HISTORY:  Grandparent  Still living? Unknown  Family history of breast cancer, Age at diagnosis: Age Unknown  Family history of hypertension, Age at diagnosis: Age Unknown    Aunt  Still living? Unknown  Family history of stroke, Age at diagnosis: Age Unknown  Family history of uterine cancer, Age at diagnosis: Age Unknown

## 2022-12-05 NOTE — ED PROVIDER NOTE - NSICDXPASTSURGICALHX_GEN_ALL_CORE_FT
PAST SURGICAL HISTORY:   delivery delivered 2018    History of placement of ear tubes as child    Perianal abscess

## 2022-12-05 NOTE — ED ADULT TRIAGE NOTE - BP NONINVASIVE DIASTOLIC (MM HG)
Patient advised of issues discussed in message below. She had already received a callback to her VM from [facility] and will call back for orders.   74

## 2022-12-05 NOTE — ED PROVIDER NOTE - OBJECTIVE STATEMENT
Leobardo ( [phone]) This is a32 yr old F, no pertinent pmh with the c/o increased anxiety, and unabel to sleep for the last couple of days. PT arrived with  Leobardo ( [phone]). 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 She feels very emotional, anxious, and at times irritable and she can not sleep makes her situation worst. She lives with her  and 2 children, 4 and 1 yr old. She would like to have resources and medication for sleep. Explicitly denies si,hi,ah,vh, any previous psychiatric tx and hospitalization, any delusions.

## 2022-12-05 NOTE — ED PROVIDER NOTE - NSFOLLOWUPINSTRUCTIONS_ED_ALL_ED_FT
PLEASE KEEP YOUR APPOINTMENT AT [facility] ON THURSDAY 12/08/22  AM.       You have been given information necessary to follow up with the  [facility] ([facility]) Crisis center & other outpatient  psychiatric clinics within your community    • [facility] walk in Crisis centre  [address] [place] [address] [phone] [email or web address]  Hours of operation:  Day	                                        Hours  Sunday                                  Closed  Monday                                9am - 3pm  Tuesday                                9am - 3pm  Wednesday                          9am - 3pm  Thursday                               9am - 3pm  Friday                                    9am - 3pm  Saturday                                Closed    .....additionally if your current problem is associated with drug or alcohol abuse further information can be obtained at the Drug Abuse Evaluation Health Referral [facility] ([facility])    • [facility] clinic [id number] [place] [address] [phone] [email or web address]    Additionally if more support and information and help is needed in the area of suicide prevention pleas3 feel free to contact :   • Suicide [place] [address]  Phone: 9-920-275-WYBV (5994)  Web Address: http://www.suicidepreventionlifeline.org  • Suicide Awareness Voices of Education  [address] [place], [place] [id number]  Phone: 1-790.419.8788  Web Address: http://www.save.org    Anxiety    WHAT YOU NEED TO KNOW:    Anxiety is a condition that causes you to feel extremely worried or nervous. The feelings are so strong that they can cause problems with your daily activities or sleep. Anxiety may be triggered by something you fear, or it may happen without a cause. Family or work stress, smoking, caffeine, and alcohol can increase your risk for anxiety. Certain medicines or health conditions can also increase your risk. Anxiety can become a long-term condition if it is not managed or treated.    DISCHARGE INSTRUCTIONS:    Call your local emergency number (911 in the [place]) if:    You have chest pain, tightness, or heaviness that may spread to your shoulders, arms, jaw, neck, or back.    You feel like hurting yourself or someone else.  Call your doctor if:    Your symptoms get worse or do not get better with treatment.    Your anxiety keeps you from doing your regular daily activities.    You have new symptoms since your last visit.    You have questions or concerns about your condition or care.  Medicines:    Medicines may be given to help you feel more calm and relaxed, and decrease your symptoms.    Take your medicine as directed. Contact your healthcare provider if you think your medicine is not helping or if you have side effects. Tell him of her if you are allergic to any medicine. Keep a list of the medicines, vitamins, and herbs you take. Include the amounts, and when and why you take them. Bring the list or the pill bottles to follow-up visits. Carry your medicine list with you in case of an emergency.  Manage anxiety:    Talk to someone about your anxiety. Your healthcare provider may suggest counseling. Cognitive behavioral therapy can help you understand and change how you react to events that trigger your symptoms. You might feel more comfortable talking with a friend or family member about your anxiety. Choose someone you know will be supportive and encouraging.    Find ways to relax. Activities such as exercise, meditation, or listening to music can help you relax. Spend time with friends, or do things you enjoy.    Practice deep breathing. Deep breathing can help you relax when you feel anxious. Focus on taking slow, deep breaths several times a day, or during an anxiety attack. Breathe in through your nose and out through your mouth.    Create a regular sleep routine. Regular sleep can help you feel calmer during the day. Go to sleep and wake up at the same times every day. Do not watch television or use the computer right before bed. Your room should be comfortable, dark, and quiet.    Eat a variety of healthy foods. Healthy foods include fruits, vegetables, low-fat dairy products, lean meats, fish, whole-grain breads, and cooked beans. Healthy foods can help you feel less anxious and have more energy.  Healthy Foods      Exercise regularly. Exercise can increase your energy level. Exercise may also lift your mood and help you sleep better. Your healthcare provider can help you create an exercise plan.  Walking for Exercise      Do not smoke. Nicotine and other chemicals in cigarettes and cigars can increase anxiety. Ask your healthcare provider for information if you currently smoke and need help to quit. E-cigarettes or smokeless tobacco still contain nicotine. Talk to your healthcare provider before you use these products.    Do not have caffeine. Caffeine can make your symptoms worse. Do not have foods or drinks that are meant to increase your energy level.    Limit or do not drink alcohol. Ask your healthcare provider if alcohol is safe for you. You may not be able to drink alcohol if you take certain anxiety or depression medicines. Limit alcohol to 1 drink per day if you are a woman. Limit alcohol to 2 drinks per day if you are a man. A drink of alcohol is 12 ounces of beer, 5 ounces of wine, or 1½ ounces of liquor.    Do not use drugs. Drugs can make your anxiety worse. It can also make anxiety hard to manage. Talk to your healthcare provider if you use drugs and want help to quit.  Follow up with your doctor within 2 weeks or as directed: Write down your questions so you remember to ask them during your visits.

## 2022-12-05 NOTE — ED ADULT TRIAGE NOTE - CHIEF COMPLAINT QUOTE
Pt c/o of anxiety x 1 week. Endorses high stress situation at work, difficulty sleeping.  Denies HI/SI/ AH/VH, drug/alcholol use. No PMHx.

## 2022-12-05 NOTE — ED ADULT NURSE NOTE - OBJECTIVE STATEMENT
Pt received in . Pt has no pmhx, presenting to ED for anxiety for the last week. Endorses high stress at work and difficulty sleeping. Denies SI/HI, auditory or visual hallucinations. Denies etoh or drug use. Respirations even and unlabored, no accessory muscle use. Pt medicated per NP orders.

## 2022-12-05 NOTE — ED ADULT TRIAGE NOTE - MEANS OF ARRIVAL
Thoracic Surgery Simple Progress Note Admit Date: 3/26/2019 POD: 7 Days Post-Op Procedure:  Procedure(s): LAPAROTOMY EXPLORATORY/ SIGMOIDECTOMY/ COLSTOMY 
externalize Shunt Ventricular-Peritoneal 
 
 
Subjective:  
 
Patient has complaints: No significant medical complaints Review of Systems: 
 
CARDIAC: positive for HTN 
RESP: positive for O2 and BIPAP use NEURO:  negative EXT: Denies new swelling or pain in the legs or calves. Objective:  
 
Blood pressure 148/62, pulse 90, temperature 99.5 °F (37.5 °C), resp. rate 24, height 5' 8\" (1.727 m), weight 181 lb 7 oz (82.3 kg), SpO2 96 %. Temp (24hrs), Av.1 °F (37.3 °C), Min:98.4 °F (36.9 °C), Max:99.5 °F (37.5 °C) Hemodynamics PAP 
  CO 
  CI 
 
04/15 0701 - 04/15 190 In: 303 [P.O.:120; I.V.:183] Out: 60 [Urine:60] 
1901 - 04/15 0700 In: 1934 [I.V.:3739] Out: 8010 [Urine:3530; Drains:494] EXAM: 
GENERAL: VSS, afrible, alert and cooperative resting in bed HEART:  regular rate and rhythm LUNG: clear to auscultation bilaterally, diminished breath sounds bilateral bases,CXR reviewed improving slowly on IV ABX and Anti fungal. WBCs still at 11 NEURO:  normal without focal findings 
mental status, speech normal, alert and oriented x iii EXTREMITIES:No evidence of DVT seen on physical exam. 
GI/: Abd soft, nonterder with + bowel sounds. Nails Labs: 
Recent Results (from the past 24 hour(s)) GLUCOSE, POC Collection Time: 19 11:32 AM  
Result Value Ref Range Glucose (POC) 240 (H) 65 - 100 mg/dL Performed by Jacklyn Rubi GLUCOSE, POC Collection Time: 19  5:41 PM  
Result Value Ref Range Glucose (POC) 196 (H) 65 - 100 mg/dL Performed by Jacklyn Rubi GLUCOSE, POC Collection Time: 04/15/19 12:33 AM  
Result Value Ref Range Glucose (POC) 209 (H) 65 - 100 mg/dL Performed by Jose Mcgee CBC WITH AUTOMATED DIFF  Collection Time: 04/15/19  3:45 AM  
 Result Value Ref Range WBC 11.8 (H) 3.6 - 11.0 K/uL  
 RBC 2.30 (L) 3.80 - 5.20 M/uL HGB 6.8 (L) 11.5 - 16.0 g/dL HCT 22.1 (L) 35.0 - 47.0 % MCV 96.1 80.0 - 99.0 FL  
 MCH 29.6 26.0 - 34.0 PG  
 MCHC 30.8 30.0 - 36.5 g/dL  
 RDW 14.6 (H) 11.5 - 14.5 % PLATELET 811 573 - 394 K/uL MPV 9.6 8.9 - 12.9 FL  
 NRBC 0.2 (H) 0  WBC ABSOLUTE NRBC 0.02 (H) 0.00 - 0.01 K/uL NEUTROPHILS 69 32 - 75 % LYMPHOCYTES 16 12 - 49 % MONOCYTES 10 5 - 13 % EOSINOPHILS 4 0 - 7 % BASOPHILS 0 0 - 1 % IMMATURE GRANULOCYTES 1 (H) 0.0 - 0.5 % ABS. NEUTROPHILS 8.1 (H) 1.8 - 8.0 K/UL  
 ABS. LYMPHOCYTES 1.9 0.8 - 3.5 K/UL  
 ABS. MONOCYTES 1.2 (H) 0.0 - 1.0 K/UL  
 ABS. EOSINOPHILS 0.5 (H) 0.0 - 0.4 K/UL  
 ABS. BASOPHILS 0.0 0.0 - 0.1 K/UL  
 ABS. IMM. GRANS. 0.1 (H) 0.00 - 0.04 K/UL  
 DF SMEAR SCANNED    
 PLATELET COMMENTS Large Platelets RBC COMMENTS MACROCYTOSIS 1+ 
    
 RBC COMMENTS ANISOCYTOSIS 1+ 
    
 RBC COMMENTS POLYCHROMASIA 1+ METABOLIC PANEL, BASIC Collection Time: 04/15/19  3:45 AM  
Result Value Ref Range Sodium 133 (L) 136 - 145 mmol/L Potassium 3.7 3.5 - 5.1 mmol/L Chloride 99 97 - 108 mmol/L  
 CO2 30 21 - 32 mmol/L Anion gap 4 (L) 5 - 15 mmol/L Glucose 149 (H) 65 - 100 mg/dL BUN 9 6 - 20 MG/DL Creatinine 0.33 (L) 0.55 - 1.02 MG/DL  
 BUN/Creatinine ratio 27 (H) 12 - 20 GFR est AA >60 >60 ml/min/1.73m2 GFR est non-AA >60 >60 ml/min/1.73m2 Calcium 8.5 8.5 - 10.1 MG/DL  
CBC WITH AUTOMATED DIFF Collection Time: 04/15/19  5:13 AM  
Result Value Ref Range WBC 11.0 3.6 - 11.0 K/uL  
 RBC 2.31 (L) 3.80 - 5.20 M/uL HGB 6.8 (L) 11.5 - 16.0 g/dL HCT 22.2 (L) 35.0 - 47.0 % MCV 96.1 80.0 - 99.0 FL  
 MCH 29.4 26.0 - 34.0 PG  
 MCHC 30.6 30.0 - 36.5 g/dL  
 RDW 14.5 11.5 - 14.5 % PLATELET 608 307 - 102 K/uL MPV 9.5 8.9 - 12.9 FL  
 NRBC 0.3 (H) 0  WBC ABSOLUTE NRBC 0.03 (H) 0.00 - 0.01 K/uL NEUTROPHILS 68 32 - 75 % LYMPHOCYTES 17 12 - 49 % MONOCYTES 10 5 - 13 % EOSINOPHILS 4 0 - 7 % BASOPHILS 0 0 - 1 % IMMATURE GRANULOCYTES 1 (H) 0.0 - 0.5 % ABS. NEUTROPHILS 7.5 1.8 - 8.0 K/UL  
 ABS. LYMPHOCYTES 1.9 0.8 - 3.5 K/UL  
 ABS. MONOCYTES 1.1 (H) 0.0 - 1.0 K/UL  
 ABS. EOSINOPHILS 0.4 0.0 - 0.4 K/UL  
 ABS. BASOPHILS 0.0 0.0 - 0.1 K/UL  
 ABS. IMM. GRANS. 0.1 (H) 0.00 - 0.04 K/UL  
 DF SMEAR SCANNED    
 PLATELET COMMENTS Large Platelets RBC COMMENTS MACROCYTOSIS 1+ 
    
 RBC COMMENTS ANISOCYTOSIS 1+ 
    
 RBC COMMENTS POLYCHROMASIA 1+ GLUCOSE, POC Collection Time: 04/15/19  6:21 AM  
Result Value Ref Range Glucose (POC) 143 (H) 65 - 100 mg/dL Performed by Basilio Matt Assessment:  
No evidence of DVT. Active Problems: 
  Perforated diverticulum of large intestine (3/26/2019) Acute hypoxemic respiratory failure ([address] (4/12/2019) Pneumonia due to infectious organism (4/12/2019) Plan/Recommendations:  
Available to assist with  shunt when patient is medically ready See orders Signed By: Glen BELL 
 
 
 
 ambulatory

## 2022-12-05 NOTE — ED PROVIDER NOTE - PATIENT PORTAL LINK FT
You can access the FollowMyHealth Patient Portal offered by [facility] by registering at the following website: [email or web address]. By joining [employer]’s FollowMyHealth portal, you will also be able to view your health information using other applications (apps) compatible with our system.

## 2022-12-08 ENCOUNTER — OUTPATIENT (OUTPATIENT)
Dept: OUTPATIENT SERVICES | Facility: HOSPITAL | Age: 32
LOS: 1 days | Discharge: STILL ADMITTED OR ADMINISTRATIVE | End: 2022-12-08
Payer: COMMERCIAL

## 2022-12-08 DIAGNOSIS — Z96.22 MYRINGOTOMY TUBE(S) STATUS: Chronic | ICD-10-CM

## 2022-12-08 DIAGNOSIS — K61.0 ANAL ABSCESS: Chronic | ICD-10-CM

## 2022-12-08 PROCEDURE — 99215 OFFICE O/P EST HI 40 MIN: CPT | Mod: 95

## 2022-12-08 PROCEDURE — 90839 PSYTX CRISIS INITIAL 60 MIN: CPT | Mod: 95

## 2022-12-12 DIAGNOSIS — F32.1 MAJOR DEPRESSIVE DISORDER, SINGLE EPISODE, MODERATE: ICD-10-CM

## 2022-12-22 PROCEDURE — 99214 OFFICE O/P EST MOD 30 MIN: CPT | Mod: 95

## 2023-04-20 NOTE — OB NEONATOLOGY/PEDIATRICIAN DELIVERY SUMMARY - NSSUCTIONBYPEDSA_OBGYN_ALL_OB
Mouth/Nose Consent (Ear)/Introductory Paragraph: The rationale for Mohs was explained to the patient and consent was obtained. The risks, benefits and alternatives to therapy were discussed in detail. Specifically, the risks of ear deformity, infection, scarring, bleeding, prolonged wound healing, incomplete removal, allergy to anesthesia, nerve injury and recurrence were addressed. Prior to the procedure, the treatment site was clearly identified and confirmed by the patient.

## 2023-05-11 NOTE — OB PST NOTE - PRETERM DELIVERIES, OB PROFILE
----- Message from LACHO Butts sent at 5/11/2023 12:26 PM CDT -----  Patient has moderate decrease in her kidney function Stage 3 CKD. Continue to follow up as scheduled with her Nephrologist. Her cholesterol levels are elevated. Recommend Low cholesterol diet and exercises as we discussed during visit. Also her cardiovascular risk  score is 8.6.  A Moderate- to high-intensity statin recommended because 10-year risk >7.5%  I will order atorvastatin  10 mg daily. We will recheck labs in 6 months.   0

## 2023-11-04 ENCOUNTER — NON-APPOINTMENT (OUTPATIENT)
Age: 33
End: 2023-11-04

## 2023-11-14 ENCOUNTER — NON-APPOINTMENT (OUTPATIENT)
Age: 33
End: 2023-11-14

## 2023-11-15 ENCOUNTER — NON-APPOINTMENT (OUTPATIENT)
Age: 33
End: 2023-11-15

## 2023-12-20 NOTE — PATIENT PROFILE OB - PRO FEEDING PREV EXP YN OB
Quality 431: Preventive Care And Screening: Unhealthy Alcohol Use - Screening: Patient not identified as an unhealthy alcohol user when screened for unhealthy alcohol use using a systematic screening method Quality 226: Preventive Care And Screening: Tobacco Use: Screening And Cessation Intervention: Patient screened for tobacco use and is an ex/non-smoker Detail Level: Generalized yes

## 2024-08-08 NOTE — OB PROVIDER H&P - NSPREVIOUSLIVEBIR_OBGYN_ALL_OB
[Fever] : no fever [Chills] : no chills [Leg Claudication] : no intermittent leg claudication [Lower Ext Edema] : no extremity edema [As Noted in HPI] : as noted in HPI Yes

## 2024-09-19 ENCOUNTER — NON-APPOINTMENT (OUTPATIENT)
Age: 34
End: 2024-09-19

## 2024-10-25 ENCOUNTER — EMERGENCY (EMERGENCY)
Facility: HOSPITAL | Age: 34
LOS: 1 days | Discharge: HOME | End: 2024-10-25
Attending: STUDENT IN AN ORGANIZED HEALTH CARE EDUCATION/TRAINING PROGRAM | Admitting: EMERGENCY MEDICINE
Payer: COMMERCIAL

## 2024-10-25 VITALS
TEMPERATURE: 98 F | DIASTOLIC BLOOD PRESSURE: 87 MMHG | WEIGHT: 166.01 LBS | HEART RATE: 91 BPM | OXYGEN SATURATION: 97 % | HEIGHT: 62 IN | RESPIRATION RATE: 18 BRPM | SYSTOLIC BLOOD PRESSURE: 132 MMHG

## 2024-10-25 DIAGNOSIS — Z96.22 MYRINGOTOMY TUBE(S) STATUS: Chronic | ICD-10-CM

## 2024-10-25 DIAGNOSIS — K61.0 ANAL ABSCESS: Chronic | ICD-10-CM

## 2024-10-25 LAB
ALBUMIN SERPL ELPH-MCNC: 4.2 G/DL — SIGNIFICANT CHANGE UP (ref 3.3–5)
ALP SERPL-CCNC: 48 U/L — SIGNIFICANT CHANGE UP (ref 40–120)
ALT FLD-CCNC: 22 U/L — SIGNIFICANT CHANGE UP (ref 12–78)
ANION GAP SERPL CALC-SCNC: 11 MMOL/L — SIGNIFICANT CHANGE UP (ref 5–17)
APAP SERPL-MCNC: <2 UG/ML — LOW (ref 10–30)
AST SERPL-CCNC: 18 U/L — SIGNIFICANT CHANGE UP (ref 15–37)
BASOPHILS # BLD AUTO: 0.08 K/UL — SIGNIFICANT CHANGE UP (ref 0–0.2)
BASOPHILS NFR BLD AUTO: 0.8 % — SIGNIFICANT CHANGE UP (ref 0–2)
BILIRUB SERPL-MCNC: 0.4 MG/DL — SIGNIFICANT CHANGE UP (ref 0.2–1.2)
BUN SERPL-MCNC: 7 MG/DL — SIGNIFICANT CHANGE UP (ref 7–23)
CALCIUM SERPL-MCNC: 8.9 MG/DL — SIGNIFICANT CHANGE UP (ref 8.5–10.1)
CHLORIDE SERPL-SCNC: 105 MMOL/L — SIGNIFICANT CHANGE UP (ref 96–108)
CO2 SERPL-SCNC: 22 MMOL/L — SIGNIFICANT CHANGE UP (ref 22–31)
CREAT SERPL-MCNC: 0.73 MG/DL — SIGNIFICANT CHANGE UP (ref 0.5–1.3)
EGFR: 109 ML/MIN/1.73M2 — SIGNIFICANT CHANGE UP
EOSINOPHIL # BLD AUTO: 0.07 K/UL — SIGNIFICANT CHANGE UP (ref 0–0.5)
EOSINOPHIL NFR BLD AUTO: 0.7 % — SIGNIFICANT CHANGE UP (ref 0–6)
ETHANOL SERPL-MCNC: <10 MG/DL — SIGNIFICANT CHANGE UP (ref 0–10)
GLUCOSE SERPL-MCNC: 107 MG/DL — HIGH (ref 70–99)
HCG UR QL: NEGATIVE — SIGNIFICANT CHANGE UP
HCT VFR BLD CALC: 40.7 % — SIGNIFICANT CHANGE UP (ref 34.5–45)
HGB BLD-MCNC: 14.5 G/DL — SIGNIFICANT CHANGE UP (ref 11.5–15.5)
IMM GRANULOCYTES NFR BLD AUTO: 0.4 % — SIGNIFICANT CHANGE UP (ref 0–0.9)
LYMPHOCYTES # BLD AUTO: 2.15 K/UL — SIGNIFICANT CHANGE UP (ref 1–3.3)
LYMPHOCYTES # BLD AUTO: 21.6 % — SIGNIFICANT CHANGE UP (ref 13–44)
MAGNESIUM SERPL-MCNC: 2.3 MG/DL — SIGNIFICANT CHANGE UP (ref 1.6–2.6)
MCHC RBC-ENTMCNC: 31 PG — SIGNIFICANT CHANGE UP (ref 27–34)
MCHC RBC-ENTMCNC: 35.6 GM/DL — SIGNIFICANT CHANGE UP (ref 32–36)
MCV RBC AUTO: 87 FL — SIGNIFICANT CHANGE UP (ref 80–100)
MONOCYTES # BLD AUTO: 0.63 K/UL — SIGNIFICANT CHANGE UP (ref 0–0.9)
MONOCYTES NFR BLD AUTO: 6.3 % — SIGNIFICANT CHANGE UP (ref 2–14)
NEUTROPHILS # BLD AUTO: 6.98 K/UL — SIGNIFICANT CHANGE UP (ref 1.8–7.4)
NEUTROPHILS NFR BLD AUTO: 70.2 % — SIGNIFICANT CHANGE UP (ref 43–77)
NRBC # BLD: 0 /100 WBCS — SIGNIFICANT CHANGE UP (ref 0–0)
PCP SPEC-MCNC: SIGNIFICANT CHANGE UP
PLATELET # BLD AUTO: 286 K/UL — SIGNIFICANT CHANGE UP (ref 150–400)
POTASSIUM SERPL-MCNC: 3.4 MMOL/L — LOW (ref 3.5–5.3)
POTASSIUM SERPL-SCNC: 3.4 MMOL/L — LOW (ref 3.5–5.3)
PROT SERPL-MCNC: 7.4 G/DL — SIGNIFICANT CHANGE UP (ref 6–8.3)
RBC # BLD: 4.68 M/UL — SIGNIFICANT CHANGE UP (ref 3.8–5.2)
RBC # FLD: 11.9 % — SIGNIFICANT CHANGE UP (ref 10.3–14.5)
SALICYLATES SERPL-MCNC: <1.7 MG/DL — LOW (ref 2.8–20)
SODIUM SERPL-SCNC: 138 MMOL/L — SIGNIFICANT CHANGE UP (ref 135–145)
TSH SERPL-MCNC: 0.55 UIU/ML — SIGNIFICANT CHANGE UP (ref 0.36–3.74)
WBC # BLD: 9.95 K/UL — SIGNIFICANT CHANGE UP (ref 3.8–10.5)
WBC # FLD AUTO: 9.95 K/UL — SIGNIFICANT CHANGE UP (ref 3.8–10.5)

## 2024-10-25 PROCEDURE — 99285 EMERGENCY DEPT VISIT HI MDM: CPT

## 2024-10-25 PROCEDURE — 93010 ELECTROCARDIOGRAM REPORT: CPT

## 2024-10-25 RX ORDER — DIPHENHYDRAMINE HCL 12.5MG/5ML
25 LIQUID (ML) ORAL ONCE
Refills: 0 | Status: COMPLETED | OUTPATIENT
Start: 2024-10-25 | End: 2024-10-25

## 2024-10-25 RX ORDER — ACETAMINOPHEN 325 MG
1000 TABLET ORAL ONCE
Refills: 0 | Status: COMPLETED | OUTPATIENT
Start: 2024-10-25 | End: 2024-10-25

## 2024-10-25 RX ORDER — METOCLOPRAMIDE HCL 5 MG
10 TABLET ORAL ONCE
Refills: 0 | Status: COMPLETED | OUTPATIENT
Start: 2024-10-25 | End: 2024-10-25

## 2024-10-25 RX ORDER — SODIUM CHLORIDE 0.9 % (FLUSH) 0.9 %
1000 SYRINGE (ML) INJECTION ONCE
Refills: 0 | Status: COMPLETED | OUTPATIENT
Start: 2024-10-25 | End: 2024-10-25

## 2024-10-25 RX ADMIN — Medication 1 MILLIGRAM(S): at 17:26

## 2024-10-25 NOTE — ED PROVIDER NOTE - PATIENT PORTAL LINK FT
You can access the FollowMyHealth Patient Portal offered by [facility] by registering at the following website: [email or web address]. By joining [employer]’s FollowMyHealth portal, you will also be able to view your health information using other applications (apps) compatible with our system.

## 2024-10-25 NOTE — ED PROVIDER NOTE - PROGRESS NOTE DETAILS
I re-evaluated the patient at this time, she declined all the initial medications and only wanted ativan I re-evaluated the patient at this time, she declined all the initial medications and only wanted ativan. When I reassessed her she states her headache has completely resolved but still can't sleep. when nurse was administering medications she was double checking all the medications and requesting to take photos as well, seeming slightly paranoid. she also recently has been scribbling in a journal which is unusual for her.    I spoke with her therapist Marlin who confirmed that her current behavior is out of ordinary I re-evaluated the patient at this time, she declined all the initial medications and only wanted ativan. When I reassessed her she states her headache has completely resolved but still can't sleep. when nurse was administering medications she was double checking all the medications and requesting to take photos as well, seeming slightly paranoid. she also recently has been scribbling in a journal which is unusual for her.    I spoke with her therapist Marlin who confirmed that her current behavior is out of ordinary. it sounds reminiscent of 2 years ago where she had a similar period of insomnia prompting paranoia and delusions which caused her to need psychiatry evaluation and follow up with therapy which she has been seeing Marlin for. patient is agreeable to speaking with telepsych at this time, concern for shaun causing her insomnia Pt signed out to me by Dr. Beyer to f/u telepsych consult  Telepsych evaluated pt, given outpt resources. Cleared for discharge

## 2024-10-25 NOTE — ED ADULT NURSE REASSESSMENT NOTE - NSFALLUNIVINTERV_ED_ALL_ED
Bed/Stretcher in lowest position, wheels locked, appropriate side rails in place/Call bell, personal items and telephone in reach/Instruct patient to call for assistance before getting out of bed/chair/stretcher/Non-slip footwear applied when patient is off stretcher/[place] to call system/Physically safe environment - no spills, clutter or unnecessary equipment/Purposeful proactive rounding/Room/bathroom lighting operational, light cord in reach

## 2024-10-25 NOTE — ED PROVIDER NOTE - NSFOLLOWUPINSTRUCTIONS_ED_ALL_ED_FT
Insomnia  Insomnia is a sleep disorder that makes it difficult to fall asleep or stay asleep. Insomnia can cause fatigue, low energy, difficulty concentrating, mood swings, and poor performance at work or school.    There are three different ways to classify insomnia:  Difficulty falling asleep.  Difficulty staying asleep.  Waking up too early in the morning.  Any type of insomnia can be long-term (chronic) or short-term (acute). Both are common. Short-term insomnia usually lasts for 3 months or less. Chronic insomnia occurs at least three times a week for longer than 3 months.    What are the causes?  Insomnia may be caused by another condition, situation, or substance, such as:  Having certain mental health conditions, such as anxiety and depression.  Using caffeine, alcohol, tobacco, or drugs.  Having gastrointestinal conditions, such as gastroesophageal reflux disease (GERD).  Having certain medical conditions. These include:  Asthma.  Alzheimer's disease.  Stroke.  Chronic pain.  An overactive thyroid gland (hyperthyroidism).  Other sleep disorders, such as restless legs syndrome and sleep apnea.  Menopause.  Sometimes, the cause of insomnia may not be known.    What increases the risk?  Risk factors for insomnia include:  Gender. Females are affected more often than males.  Age. Insomnia is more common as people get older.  Stress and certain medical and mental health conditions.  Lack of exercise.  Having an irregular work schedule. This may include working night shifts and traveling between different time zones.  What are the signs or symptoms?  If you have insomnia, the main symptom is having trouble falling asleep or having trouble staying asleep. This may lead to other symptoms, such as:  Feeling tired or having low energy.  Feeling nervous about going to sleep.  Not feeling rested in the morning.  Having trouble concentrating.  Feeling irritable, anxious, or depressed.  How is this diagnosed?  This condition may be diagnosed based on:  Your symptoms and medical history. Your health care provider may ask about:  Your sleep habits.  Any medical conditions you have.  Your mental health.  A physical exam.  How is this treated?  Treatment for insomnia depends on the cause. Treatment may focus on treating an underlying condition that is causing the insomnia. Treatment may also include:  Medicines to help you sleep.  Counseling or therapy.  Lifestyle adjustments to help you sleep better.  Follow these instructions at home:  Eating and drinking    A sign showing that a person should not drink alcohol.  Limit or avoid alcohol, caffeinated beverages, and products that contain nicotine and tobacco, especially close to bedtime. These can disrupt your sleep.  Do not eat a large meal or eat spicy foods right before bedtime. This can lead to digestive discomfort that can make it hard for you to sleep.  Sleep habits    A person writing in a diary.  Keep a sleep diary to help you and your health care provider figure out what could be causing your insomnia. Write down:  When you sleep.  When you wake up during the night.  How well you sleep and how rested you feel the next day.  Any side effects of medicines you are taking.  What you eat and drink.  Make your bedroom a dark, comfortable place where it is easy to fall asleep.  Put up shades or blackout curtains to block light from outside.  Use a white noise machine to block noise.  Keep the temperature cool.  Limit screen use before bedtime. This includes:  Not watching TV.  Not using your smartphone, tablet, or computer.  Stick to a routine that includes going to bed and waking up at the same times every day and night. This can help you fall asleep faster. Consider making a quiet activity, such as reading, part of your nighttime routine.  Try to avoid taking naps during the day so that you sleep better at night.  Get out of bed if you are still awake after 15 minutes of trying to sleep. Keep the lights down, but try reading or doing a quiet activity. When you feel sleepy, go back to bed.  General instructions    Take over-the-counter and prescription medicines only as told by your health care provider.  Exercise regularly as told by your health care provider. However, avoid exercising in the hours right before bedtime.  Use relaxation techniques to manage stress. Ask your health care provider to suggest some techniques that may work well for you. These may include:  Breathing exercises.  Routines to release muscle tension.  Visualizing peaceful scenes.  Make sure that you drive carefully. Do not drive if you feel very sleepy.  Keep all follow-up visits. This is important.  Contact a health care provider if:  You are tired throughout the day.  You have trouble in your daily routine due to sleepiness.  You continue to have sleep problems, or your sleep problems get worse.  Get help right away if:  You have thoughts about hurting yourself or someone else.  Get help right away if you feel like you may hurt yourself or others, or have thoughts about taking your own life. Go to your nearest emergency room or:  Call 911.  Call the National Suicide Prevention Lifeline at 1-688.766.2609 or 566. This is open 24 hours a day.  Text the Crisis Text Line at 045315.  Summary  Insomnia is a sleep disorder that makes it difficult to fall asleep or stay asleep.  Insomnia can be long-term (chronic) or short-term (acute).  Treatment for insomnia depends on the cause. Treatment may focus on treating an underlying condition that is causing the insomnia.  Keep a sleep diary to help you and your health care provider figure out what could be causing your insomnia.  This information is not intended to replace advice given to you by your health care provider. Make sure you discuss any questions you have with your health care provider.
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
## 2024-10-25 NOTE — ED BEHAVIORAL HEALTH ASSESSMENT NOTE - DESCRIPTION
migraine lives with  and 2 children, works as  Patient received Benadryl 25mg, Ativan 1mg, metoclopramide 10mg, Tylenol 1000mg IV

## 2024-10-25 NOTE — ED ADULT TRIAGE NOTE - NS ED TRIAGE AVPU SCALE
Received order for Diabetes education.  Left message for pt to return call to schedule.  Contact information provided.   Alert-The patient is alert, awake and responds to voice. The patient is oriented to time, place, and person. The triage nurse is able to obtain subjective information.

## 2024-10-25 NOTE — ED BEHAVIORAL HEALTH ASSESSMENT NOTE - OTHER PAST PSYCHIATRIC HISTORY (INCLUDE DETAILS REGARDING ONSET, COURSE OF ILLNESS, INPATIENT/OUTPATIENT TREATMENT)
No prior hospitalizations, no SA, no NSSI.  Patient has therapist, Marlin De León who she sees every Thursday

## 2024-10-25 NOTE — ED ADULT NURSE NOTE - OBJECTIVE STATEMENT
pt presents with c/o migraine with insomnia x 5 days. pt states her insomnia comes from anxiety about worrying about her performance at work. pt denies Si/HI. reports to a therapist regularly. pt states she wants to get over the anxiety naturally and does not want to take medications. pt denies chest pain, sob, n/v/d.

## 2024-10-25 NOTE — ED PROVIDER NOTE - DATE/TIME 2
[facility] Therapy Note in Person  Anni Pedro, [address]   2/23/23  5:15 PM  [facility]  1973  [id number]    Time spent with Patient: 60 minutes      Pt was provided informed consent for the [address] [place] [address]. Discussed with patient model of service to include the limits of confidentiality (i.e. abuse reporting, suicide intervention, etc.) and short-term intervention focused approach. Pt indicated understanding. S:  Pt and therapist engaged in check in. Therapist and pt processed how pt ran into her daughter's F (and his wife) in the hospital.  Therapist and pt also processed pt's recent interactions with her daughter. Therapist used cognitive skills and offered emotional support. Therapist and pt began to discuss social skills. Therapist used gentle confrontation to identify for pt her habit of interrupting and speaking over therapist.  Therapist and pt then discussed and processed pt's wants for her relationship with her daughter and the thoughts and feelings she has about it. Pt was able to identify that she feels like she \"means nothing\" to her daughter and isn't her mother anymore. Therapist and pt processed how relationships with adult children change, as well as what healthy relationships look like.      O:  MSE:     Appearance    alert, cooperative, moderate distress, crying  Appetite normal  Sleep disturbance Yes  Fatigue Yes  Loss of pleasure Yes  Impulsive behavior No  Speech    pressured and interrupting  Mood    Depressed  Demoralization  Affect    depressed affect  Thought Content    worthlessness and excessive guilt  Thought Process    tangential  Associations    tangential connections  Insight    Good  Judgment    Intact  Orientation    oriented to person, place, time, and general circumstances  Memory    recent and remote memory intact  Attention/Concentration    impaired  Morbid ideation No  Suicide Assessment    no suicidal ideation    A:  Pt presented in demoralized and depressed mood with depressed affect. Pt had interacted with multiple difficult people that day and needed to process it. Once pt processed those interactions and gave herself the chance to express her emotions, her mood improved. Pt was tearful at other poitns in session when processing her relationship with her daughter. Pt was able to identify that it is what has been bothering her the most lately. Pt was also able to identify that she feels Brooke Katelyn (ex's wife) \"took something else from her\" when she got to be at pt's daughter's wedding. Pt engaged in cathartic emotional release during session by processing experiences, thoughts and emotions. Pt's speech was tangential and pressured throughout. During reassessment therapist will assess for borderline personality disorder. Visit Diagnosis:     Post Traumatic Stress Disorder -Pt is reporting intrusive and arousal symptoms. Pt is experiencing repeated dreams, memories, and flashbacks of the stressful experience. Pt is avoiding memories and external reminders. Pt has strong physical and emotional reactions to these exposures. Pt reports strong negative beliefs about herself. Pt reports self-blame, strong feelings such as fear horror or anger, los of interest in things she used to enjoy, some self-isolation, difficulty experiencing positive emotion, irritability, diffiuclty concentrating, and difficulty sleeping.      R/O Borderline personality disorder     History from Medical Record:        Diagnosis Date    Abnormal Pap smear of cervix     ADHD (attention deficit hyperactivity disorder)     Anxiety     Depression     Fibromyalgia     Hyperlipidemia     Obesity     Unspecified sleep apnea     UTI (urinary tract infection)      Medications:   Current Outpatient Medications   Medication Sig Dispense Refill    pantoprazole (PROTONIX) 40 MG tablet take 1 tablet by mouth once daily 90 tablet 0    sertraline (ZOLOFT) 100 MG tablet take 1 AND 1/2 TABLETS by mouth daily 45 tablet 5    cyanocobalamin 1000 MCG/ML injection inject 1 milliliter ( 1000 MCG ) intramuscularly EVERY 30 DAYS 1 mL 11    busPIRone (BUSPAR) 7.5 MG tablet take 1 tablet by mouth twice a day if needed for anxiety 60 tablet 0    hydrOXYzine (ATARAX) 10 MG tablet Take 1 tablet by mouth 3 times daily as needed for Anxiety 90 tablet 2    traZODone (DESYREL) 50 MG tablet Take 0.5 tablets by mouth nightly as needed for Sleep 15 tablet 2    topiramate (TOPAMAX) 100 MG tablet Take 1 tablet by mouth 2 times daily 60 tablet 3    diclofenac sodium (VOLTAREN) 1 % GEL apply 4 grams topically four times a day AS NEEDED FOR PAIN 500 g 5    amphetamine-dextroamphetamine (ADDERALL) 15 MG tablet take 1 tablet by mouth twice a day 60 tablet 0    levETIRAcetam (KEPPRA) 750 MG tablet take 1 tablet by mouth twice a day      fluticasone (FLONASE) 50 MCG/ACT nasal spray 1 spray by Nasal route daily 16 g 5    Pantoprazole Sodium (PROTONIX PO) Take by mouth      loratadine (CLARITIN) 10 MG tablet take 1 tablet by mouth once daily if needed 30 tablet 5    Mirabegron ER (MYRBETRIQ) 25 MG TB24 Take 1 tablet by mouth daily 14 tablet 0    IRON PO       Vitamin D (CHOLECALCIFEROL) 1000 UNITS CAPS capsule Take 1,000 Units by mouth daily. CALCIUM CITRATE PO Take 1,500 mg by mouth daily. acetaminophen (TYLENOL) 325 MG tablet Take 650 mg by mouth every 6 hours as needed. No current facility-administered medications for this encounter.        Social History:   Social History     Socioeconomic History    Marital status:      Spouse name: Not on file    Number of children: Not on file    Years of education: Not on file    Highest education level: Not on file   Occupational History    Not on file   Tobacco Use    Smoking status: Former     Packs/day: 0.00     Years: 1.00     Pack years: 0.00     Types: Cigarettes     Quit date: 1994     Years since quittin.5    Smokeless tobacco: Never Substance and Sexual Activity    Alcohol use: No    Drug use: No    Sexual activity: Not on file   Other Topics Concern    Not on file   Social History Narrative    Not on file     Social Determinants of Health     Financial Resource Strain: Low Risk     Difficulty of Paying Living Expenses: Not hard at all   Food Insecurity: No Food Insecurity    Worried About Running Out of Food in the Last Year: Never true    Ran Out of Food in the Last Year: Never true   Transportation Needs: Not on file   Physical Activity: Not on file   Stress: Not on file   Social Connections: Not on file   Intimate Partner Violence: Not on file   Housing Stability: Not on file       TOBACCO:   reports that she quit smoking about 28 years ago. Her smoking use included cigarettes. She has never used smokeless tobacco.  ETOH:   reports no history of alcohol use. Family History:   Family History   Problem Relation Age of Onset    Diabetes Mother     High Blood Pressure Mother     Heart Disease Mother     Heart Disease Father     Cancer Father         prostate    Cancer Paternal Grandmother         breast cancer at age 80           Pt interventions:  Trained in improving communication skills, Provided education, Provided education on PTSD symptoms and treatment options for evidence-based treatment (Cognitive Processing Therapy and Prolonged Exposure), Established rapport, Conducted functional assessment, Supportive techniques, CBT to target distortions, and Identified maladaptive thoughts        PLAN:   -Reassessment for goals and dx  -EMDR       Patient scheduled to return on:   TBD, therapist will reach out to pt on Monday 3/6/23 to determine     Were changes or additions made to the treatment plan today?   YES []   NO [x]  Noted changes: 26-Oct-2024 00:36

## 2024-10-25 NOTE — ED ADULT TRIAGE NOTE - CHIEF COMPLAINT QUOTE
pt ambulatory to triage a&ox4 with c/o frequent headaches, worse in the last week.. some photophobia. +PERRL. moving all extremities equally. speech clear and logical. took motrin before coming to ED with minimal relief pt ambulatory to triage a&ox4 with c/o frequent headaches, worse in the last week.. some photophobia. +PERRL. moving all extremities equally. speech clear and logical. took motrin before coming to ED with minimal relief.  also reports she has been paranoid and anxious and "thinks people are following her" and therapist told  to bring her to ED

## 2024-10-25 NOTE — ED ADULT NURSE NOTE - CHIEF COMPLAINT QUOTE
pt ambulatory to triage a&ox4 with c/o frequent headaches, worse in the last week.. some photophobia. +PERRL. moving all extremities equally. speech clear and logical. took motrin before coming to ED with minimal relief.  also reports she has been paranoid and anxious and "thinks people are following her" and therapist told  to bring her to ED

## 2024-10-25 NOTE — ED PROVIDER NOTE - OBJECTIVE STATEMENT
34F who presents with headache and anxiety. patient states that for the past week she has been having progressively worsening headache, which improves with medicine but then returns. her headache is bilateral temples and also posterior. she has had trouble sleeping as well and her symptoms also cause her to be restless and affect her sleep, all of which worsens her anxiety. she went to [facility] multiple times, initially was given lexapro which did not help her and made her dizzy and she was given meclizine. then went to [facility] ED 2 days ago, where she had a CT scan and was given medications there. she was still having pain and symptoms and was pending an MRI however she got frustrated and signed out AMA. same symptoms persisting here, took tylenol around 8am

## 2024-10-25 NOTE — ED ADULT NURSE REASSESSMENT NOTE - DESCRIPTION
pt is axox3, completed tele-psych and awaiting MD orders and assessment.  Pt has no needs at this time, will ctm
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
## 2024-10-25 NOTE — ED PROVIDER NOTE - CLINICAL SUMMARY MEDICAL DECISION MAKING FREE TEXT BOX
34F who presents with headache and anxiety. patient states that for the past week she has been having progressively worsening headache, which improves with medicine but then returns. she has had trouble sleeping as well and her symptoms also cause her to be restless and affect her sleep, all of which worsens her anxiety. she went to [facility] multiple times, initially was given lexapro which did not help her and made her dizzy and she was given meclizine. then went to [facility] ED 2 days ago, where she had a CT scan and was given medications there. she was still having pain and symptoms and was pending an MRI however she got frustrated and signed out AMA. same symptoms persisting here, took tylenol around 8am    PE: Patient is well appearing, no acute distress, anxious, no signs of head trauma, lungs clear bilaterally, abdomen is soft and nontender to palpation without guarding or rebound, normal pulses in all extremities, moving all extremities well and normal ambulation    labs, medications, reassess 34F who presents with headache and anxiety. patient states that for the past week she has been having progressively worsening headache, which improves with medicine but then returns. she has had trouble sleeping as well and her symptoms also cause her to be restless and affect her sleep, all of which worsens her anxiety. she went to [facility] multiple times, initially was given lexapro which did not help her and made her dizzy and she was given meclizine. then went to [facility] ED 2 days ago, where she had a CT scan and was given medications there. she was still having pain and symptoms and was pending an MRI however she got frustrated and signed out AMA. same symptoms persisting here, took tylenol around 8am    PE: Patient is well appearing, no acute distress, anxious, no signs of head trauma, lungs clear bilaterally, abdomen is soft and nontender to palpation without guarding or rebound, normal pulses in all extremities, moving all extremities well and normal ambulation    labs, medications, reassess    I re-evaluated the patient at this time, she declined all the initial medications and only wanted ativan. When I reassessed her she states her headache has completely resolved but still can't sleep. when nurse was administering medications she was double checking all the medications and requesting to take photos as well, seeming slightly paranoid. she also recently has been scribbling in a journal which is unusual for her.    I spoke with her therapist Marlin who confirmed that her current behavior is out of ordinary. it sounds reminiscent of 2 years ago where she had a similar period of insomnia prompting paranoia and delusions which caused her to need psychiatry evaluation and follow up with therapy which she has been seeing Marlin for. patient is agreeable to speaking with telepsych at this time, concern for shaun causing her insomnia. telepsych consulted pending evaluation

## 2024-10-25 NOTE — ED PROVIDER NOTE - PHYSICAL EXAMINATION
Gen: Awake, Alert, NAD, anxious  Head:  NC/AT  Eyes:  PERRL, EOMI, Conjunctiva pink, no scleral icterus  ENT:  no exudates, no erythema, uvula midline, TMs clear bilaterally, moist mucus membranes  Neck: supple, nontender, no meningismus, no JVD, trachea midline  Cardiac/CV:  S1 S2, RRR, no murmurs  Respiratory/Pulm:  CTAB, good air movement, normal resp effort, no wheezes/stridor/retractions/rales/rhonchi  Gastrointestinal/Abdomen:  Soft, nontender, nondistended, no rebound/guarding  Ext:  warm, well perfused, moving all extremities spontaneously, no cyanosis, no erythema, no edema, distal pulses intact  Skin: intact, no rash, no petechiae, no ecchymosis  Neuro:  AAOx3, sensation intact, motor 5/5 x 4 extremities, clear speech, normal gait

## 2024-10-26 VITALS
DIASTOLIC BLOOD PRESSURE: 85 MMHG | TEMPERATURE: 98 F | HEART RATE: 71 BPM | OXYGEN SATURATION: 98 % | SYSTOLIC BLOOD PRESSURE: 129 MMHG | RESPIRATION RATE: 17 BRPM

## 2024-10-26 PROCEDURE — 85025 COMPLETE CBC W/AUTO DIFF WBC: CPT

## 2024-10-26 PROCEDURE — 99284 EMERGENCY DEPT VISIT MOD MDM: CPT | Mod: 25

## 2024-10-26 PROCEDURE — 80307 DRUG TEST PRSMV CHEM ANLYZR: CPT

## 2024-10-26 PROCEDURE — 96374 THER/PROPH/DIAG INJ IV PUSH: CPT

## 2024-10-26 PROCEDURE — 81025 URINE PREGNANCY TEST: CPT

## 2024-10-26 PROCEDURE — 93005 ELECTROCARDIOGRAM TRACING: CPT

## 2024-10-26 PROCEDURE — 80053 COMPREHEN METABOLIC PANEL: CPT

## 2024-10-26 PROCEDURE — 83735 ASSAY OF MAGNESIUM: CPT

## 2024-10-26 PROCEDURE — 36415 COLL VENOUS BLD VENIPUNCTURE: CPT

## 2024-10-26 PROCEDURE — 84443 ASSAY THYROID STIM HORMONE: CPT

## 2024-10-26 RX ADMIN — Medication 2 MILLIGRAM(S): at 00:54

## 2024-10-26 NOTE — ED BEHAVIORAL HEALTH NOTE - BEHAVIORAL HEALTH NOTE
NAME: Leobardo Sanford      NUMBER: [phone]      RELATIONSHIP:  Spouse     RELIABILITY:      COMMENTS: The patient hasn’t been sleeping in the last week due to anxiety. A similar issue happened 2 years ago. She was given a sleep aid and was able to get enough sleep to be well. Reports she was given Ativan, unclear of dosage but reports she did not take it very long. Some paranoia, thinking she is being followed and watched by unknown persons, started 2 days ago. There are firearms in the home, they are locked up, she does have the code. The  is willing to change the code. Feels safe with her at home. Denies any SI, or violence. Wants her to get something to help with her sleep. No other issues or concerns.          NAME: Marlin De León       NUMBER: [phone]     RELATIONSHIP:  Therapist     RELIABILITY:      COMMENTS:  No answer, vm left

## 2024-10-28 ENCOUNTER — NON-APPOINTMENT (OUTPATIENT)
Age: 34
End: 2024-10-28

## 2024-10-28 ENCOUNTER — APPOINTMENT (OUTPATIENT)
Dept: OPHTHALMOLOGY | Facility: CLINIC | Age: 34
End: 2024-10-28

## 2024-10-28 PROCEDURE — 92004 COMPRE OPH EXAM NEW PT 1/>: CPT

## 2024-12-03 ENCOUNTER — TRANSCRIPTION ENCOUNTER (OUTPATIENT)
Age: 34
End: 2024-12-03

## 2024-12-04 ENCOUNTER — APPOINTMENT (OUTPATIENT)
Dept: INTERNAL MEDICINE | Facility: CLINIC | Age: 34
End: 2024-12-04

## 2024-12-19 ENCOUNTER — OUTPATIENT (OUTPATIENT)
Dept: OUTPATIENT SERVICES | Facility: HOSPITAL | Age: 34
LOS: 1 days | Discharge: ANOTHER HOSPITAL | End: 2024-12-19
Payer: COMMERCIAL

## 2024-12-19 DIAGNOSIS — K61.0 ANAL ABSCESS: Chronic | ICD-10-CM

## 2024-12-19 DIAGNOSIS — Z96.22 MYRINGOTOMY TUBE(S) STATUS: Chronic | ICD-10-CM

## 2024-12-19 PROCEDURE — 99215 OFFICE O/P EST HI 40 MIN: CPT | Mod: 95

## 2024-12-23 ENCOUNTER — EMERGENCY (EMERGENCY)
Facility: HOSPITAL | Age: 34
LOS: 1 days | Discharge: HOME | End: 2024-12-23
Attending: STUDENT IN AN ORGANIZED HEALTH CARE EDUCATION/TRAINING PROGRAM
Payer: COMMERCIAL

## 2024-12-23 ENCOUNTER — APPOINTMENT (OUTPATIENT)
Dept: OBGYN | Facility: CLINIC | Age: 34
End: 2024-12-23

## 2024-12-23 VITALS
WEIGHT: 149.91 LBS | HEART RATE: 120 BPM | TEMPERATURE: 98 F | DIASTOLIC BLOOD PRESSURE: 85 MMHG | HEIGHT: 65 IN | OXYGEN SATURATION: 97 % | RESPIRATION RATE: 18 BRPM | SYSTOLIC BLOOD PRESSURE: 140 MMHG

## 2024-12-23 DIAGNOSIS — F31.13 BIPOLAR DISORDER, CURRENT EPISODE MANIC WITHOUT PSYCHOTIC FEATURES, SEVERE: ICD-10-CM

## 2024-12-23 DIAGNOSIS — Z96.22 MYRINGOTOMY TUBE(S) STATUS: Chronic | ICD-10-CM

## 2024-12-23 DIAGNOSIS — K61.0 ANAL ABSCESS: Chronic | ICD-10-CM

## 2024-12-23 LAB
ALBUMIN SERPL ELPH-MCNC: 5 G/DL — SIGNIFICANT CHANGE UP (ref 3.3–5.2)
ALP SERPL-CCNC: 47 U/L — SIGNIFICANT CHANGE UP (ref 40–120)
ALT FLD-CCNC: 12 U/L — SIGNIFICANT CHANGE UP
AMPHET UR-MCNC: POSITIVE
ANION GAP SERPL CALC-SCNC: 18 MMOL/L — HIGH (ref 5–17)
APAP SERPL-MCNC: <3 UG/ML — LOW (ref 10–26)
APPEARANCE UR: ABNORMAL
AST SERPL-CCNC: 15 U/L — SIGNIFICANT CHANGE UP
BACTERIA # UR AUTO: ABNORMAL /HPF
BARBITURATES UR SCN-MCNC: NEGATIVE — SIGNIFICANT CHANGE UP
BASOPHILS # BLD AUTO: 0.03 K/UL — SIGNIFICANT CHANGE UP (ref 0–0.2)
BASOPHILS NFR BLD AUTO: 0.4 % — SIGNIFICANT CHANGE UP (ref 0–2)
BENZODIAZ UR-MCNC: NEGATIVE — SIGNIFICANT CHANGE UP
BILIRUB SERPL-MCNC: 0.4 MG/DL — SIGNIFICANT CHANGE UP (ref 0.4–2)
BILIRUB UR-MCNC: ABNORMAL
BUN SERPL-MCNC: 9.9 MG/DL — SIGNIFICANT CHANGE UP (ref 8–20)
CALCIUM SERPL-MCNC: 9 MG/DL — SIGNIFICANT CHANGE UP (ref 8.4–10.5)
CAST: 4 /LPF — SIGNIFICANT CHANGE UP (ref 0–4)
CHLORIDE SERPL-SCNC: 102 MMOL/L — SIGNIFICANT CHANGE UP (ref 96–108)
CO2 SERPL-SCNC: 18 MMOL/L — LOW (ref 22–29)
COCAINE METAB.OTHER UR-MCNC: NEGATIVE — SIGNIFICANT CHANGE UP
COLOR SPEC: SIGNIFICANT CHANGE UP
CREAT SERPL-MCNC: 0.64 MG/DL — SIGNIFICANT CHANGE UP (ref 0.5–1.3)
DIFF PNL FLD: NEGATIVE — SIGNIFICANT CHANGE UP
EGFR: 117 ML/MIN/1.73M2 — SIGNIFICANT CHANGE UP
EOSINOPHIL # BLD AUTO: 0 K/UL — SIGNIFICANT CHANGE UP (ref 0–0.5)
EOSINOPHIL NFR BLD AUTO: 0 % — SIGNIFICANT CHANGE UP (ref 0–6)
ETHANOL SERPL-MCNC: <10 MG/DL — SIGNIFICANT CHANGE UP (ref 0–9)
FENTANYL UR QL SCN: NEGATIVE — SIGNIFICANT CHANGE UP
GLUCOSE SERPL-MCNC: 98 MG/DL — SIGNIFICANT CHANGE UP (ref 70–99)
GLUCOSE UR QL: NEGATIVE MG/DL — SIGNIFICANT CHANGE UP
HCG SERPL-ACNC: <4 MIU/ML — SIGNIFICANT CHANGE UP
HCT VFR BLD CALC: 42.6 % — SIGNIFICANT CHANGE UP (ref 34.5–45)
HGB BLD-MCNC: 15.3 G/DL — SIGNIFICANT CHANGE UP (ref 11.5–15.5)
IMM GRANULOCYTES NFR BLD AUTO: 0.3 % — SIGNIFICANT CHANGE UP (ref 0–0.9)
KETONES UR-MCNC: >=160 MG/DL
LEUKOCYTE ESTERASE UR-ACNC: NEGATIVE — SIGNIFICANT CHANGE UP
LYMPHOCYTES # BLD AUTO: 0.7 K/UL — LOW (ref 1–3.3)
LYMPHOCYTES # BLD AUTO: 9 % — LOW (ref 13–44)
MCHC RBC-ENTMCNC: 30.7 PG — SIGNIFICANT CHANGE UP (ref 27–34)
MCHC RBC-ENTMCNC: 35.9 G/DL — SIGNIFICANT CHANGE UP (ref 32–36)
MCV RBC AUTO: 85.5 FL — SIGNIFICANT CHANGE UP (ref 80–100)
METHADONE UR-MCNC: NEGATIVE — SIGNIFICANT CHANGE UP
MONOCYTES # BLD AUTO: 0.34 K/UL — SIGNIFICANT CHANGE UP (ref 0–0.9)
MONOCYTES NFR BLD AUTO: 4.3 % — SIGNIFICANT CHANGE UP (ref 2–14)
NEUTROPHILS # BLD AUTO: 6.73 K/UL — SIGNIFICANT CHANGE UP (ref 1.8–7.4)
NEUTROPHILS NFR BLD AUTO: 86 % — HIGH (ref 43–77)
NITRITE UR-MCNC: NEGATIVE — SIGNIFICANT CHANGE UP
OPIATES UR-MCNC: NEGATIVE — SIGNIFICANT CHANGE UP
PCP SPEC-MCNC: SIGNIFICANT CHANGE UP
PCP UR-MCNC: NEGATIVE — SIGNIFICANT CHANGE UP
PH UR: 5.5 — SIGNIFICANT CHANGE UP (ref 5–8)
PLATELET # BLD AUTO: 287 K/UL — SIGNIFICANT CHANGE UP (ref 150–400)
POTASSIUM SERPL-MCNC: 4 MMOL/L — SIGNIFICANT CHANGE UP (ref 3.5–5.3)
POTASSIUM SERPL-SCNC: 4 MMOL/L — SIGNIFICANT CHANGE UP (ref 3.5–5.3)
PROT SERPL-MCNC: 7.3 G/DL — SIGNIFICANT CHANGE UP (ref 6.6–8.7)
PROT UR-MCNC: 100 MG/DL
RBC # BLD: 4.98 M/UL — SIGNIFICANT CHANGE UP (ref 3.8–5.2)
RBC # FLD: 12.3 % — SIGNIFICANT CHANGE UP (ref 10.3–14.5)
RBC CASTS # UR COMP ASSIST: 4 /HPF — SIGNIFICANT CHANGE UP (ref 0–4)
SALICYLATES SERPL-MCNC: <0.6 MG/DL — LOW (ref 10–20)
SARS-COV-2 RNA SPEC QL NAA+PROBE: SIGNIFICANT CHANGE UP
SODIUM SERPL-SCNC: 138 MMOL/L — SIGNIFICANT CHANGE UP (ref 135–145)
SP GR SPEC: >1.03 — HIGH (ref 1–1.03)
SQUAMOUS # UR AUTO: 15 /HPF — HIGH (ref 0–5)
THC UR QL: NEGATIVE — SIGNIFICANT CHANGE UP
TSH SERPL-MCNC: 0.6 UIU/ML — SIGNIFICANT CHANGE UP (ref 0.27–4.2)
UROBILINOGEN FLD QL: 1 MG/DL — SIGNIFICANT CHANGE UP (ref 0.2–1)
WBC # BLD: 7.82 K/UL — SIGNIFICANT CHANGE UP (ref 3.8–10.5)
WBC # FLD AUTO: 7.82 K/UL — SIGNIFICANT CHANGE UP (ref 3.8–10.5)
WBC UR QL: 0 /HPF — SIGNIFICANT CHANGE UP (ref 0–5)

## 2024-12-23 PROCEDURE — 99223 1ST HOSP IP/OBS HIGH 75: CPT

## 2024-12-23 PROCEDURE — 93010 ELECTROCARDIOGRAM REPORT: CPT

## 2024-12-23 PROCEDURE — 90792 PSYCH DIAG EVAL W/MED SRVCS: CPT

## 2024-12-23 RX ORDER — HALOPERIDOL 2 MG
5 TABLET ORAL EVERY 6 HOURS
Refills: 0 | Status: DISCONTINUED | OUTPATIENT
Start: 2024-12-23 | End: 2024-12-31

## 2024-12-23 RX ORDER — LORAZEPAM 2 MG/1
2 TABLET ORAL EVERY 6 HOURS
Refills: 0 | Status: DISCONTINUED | OUTPATIENT
Start: 2024-12-23 | End: 2024-12-23

## 2024-12-23 RX ADMIN — LORAZEPAM 2 MILLIGRAM(S): 2 TABLET ORAL at 15:25

## 2024-12-23 RX ADMIN — Medication 5 MILLIGRAM(S): at 15:25

## 2024-12-23 NOTE — ED BEHAVIORAL HEALTH ASSESSMENT NOTE - REASON
hold for reassess if patient clears up as she typically does, also require more collateral from outpatient psychiatrist regarding medications and changes. Utox also positive for amphetamines - potential surrepitious use?

## 2024-12-23 NOTE — ED BEHAVIORAL HEALTH ASSESSMENT NOTE - OTHER PAST PSYCHIATRIC HISTORY (INCLUDE DETAILS REGARDING ONSET, COURSE OF ILLNESS, INPATIENT/OUTPATIENT TREATMENT)
multiple past psych ER visits, about 3-4 times in the last 3 months - typically discharged after an overnight hold when the patient sleeps better. no past [facility] admisisons. no known past SAs.

## 2024-12-23 NOTE — ED ADULT TRIAGE NOTE - CHIEF COMPLAINT QUOTE
sent in by psychiatrist for eval. pt reports poor sleep with increased auditory hallucinations over the last couple of days. denies si/hi/ or visual hallucinations . pt placed into yellow gown.

## 2024-12-23 NOTE — ED ADULT NURSE REASSESSMENT NOTE - NS ED NURSE REASSESS COMMENT FT1
received pt resting in bed easily aroused to verbal stimuli.  pt calm cooperative at this time covid obtained ekg called for ekg.  charge rn called also notified ekg needs to be complete.  covid specimen sent.  2 glasses of water givento pt encouraging po fluid
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
## 2024-12-23 NOTE — ED PROVIDER NOTE - PROGRESS NOTE DETAILS
Luna Stearns, DO: I received sign out from Dr. Ruiz pending psych assessment. Labs reviewed, patient positive for amphetamines and large ketones in urine, as well as bicarb of 18. I discussed with psych RN who notes patient received Haldol and Ativan for paranoia and was not drinking. He will encourage PO hydration when she wakes up and repeat labs ordered for the morning.

## 2024-12-23 NOTE — ED CDU PROVIDER INITIAL DAY NOTE - OBJECTIVE STATEMENT
34-year-old female past medical history of bipolar disorder, depression, presenting with bizarre behavior.  Patient accompanied by mother who is providing the majority of the history.  Over the past 3 weeks, patient has become increasingly more paranoid, closing the doors when speaking with people because she thinks people are listening, turning off Christy, unplugging the phone.    Patient is a , has a high stress job, she went to a work conference about 3 weeks ago, during which she discontinued her psychiatric medication for 3 days (caplyta, trazodone), and has been having difficulty sleeping, and increasingly paranoid since then.  Over the last week, patient has not slept much at all.  Since Saturday, patient has become catatonic, not speaking, staring off into space.  Saw psychiatrist today, who recommended inpatient psychiatric admission.  Patient went to [facility], however was referred to [place] ED for medical clearance.  Patient denies suicidal or homicidal ideation.  Denies drug or alcohol use.  No history of inpatient psychiatric hospitalizations in the past.

## 2024-12-23 NOTE — ED PROVIDER NOTE - PHYSICAL EXAMINATION
Gen: NAD  Head: NCAT  HEENT: oral mucosa moist, normal conjunctiva, oropharynx clear without exudate or erythema  Lung: CTAB, no respiratory distress, no wheezing, rales, rhonchi  CV: normal s1/s2, rrr, no murmurs, Normal perfusion, pulses 2+ throughout  Abd: soft, NTND  MSK: No edema, no visible deformities, full range of motion in all 4 extremities  Neuro: CN II-XII grossly intact, No focal neurologic deficits  Skin: No rash   Psych: catatonic appearing

## 2024-12-23 NOTE — ED CDU PROVIDER INITIAL DAY NOTE - DETAILS
Patient presented with paranoia and refusing to eat or drink after being off psych medications x 3 days, labs showed dehydration - patient treated for paranoia - in observation for repeat labs in the AM following oral hydration and psych evaluation when able to conversate.
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
## 2024-12-23 NOTE — ED BEHAVIORAL HEALTH ASSESSMENT NOTE - DESCRIPTION
as above Vital Signs Last 24 Hrs  T(C): 36.6 (23 Dec 2024 15:15), Max: 36.6 (23 Dec 2024 15:15)  T(F): 97.8 (23 Dec 2024 15:15), Max: 97.8 (23 Dec 2024 15:15)  HR: 133 (23 Dec 2024 15:15) (120 - 133)  BP: 150/80 (23 Dec 2024 15:15) (121/83 - 150/80)  BP(mean): --  RR: 18 (23 Dec 2024 15:15) (18 - 18)  SpO2: 97% (23 Dec 2024 15:15) (97% - 98%)    Parameters below as of 23 Dec 2024 15:15  Patient On (Oxygen Delivery Method): room air asthma

## 2024-12-23 NOTE — ED PROVIDER NOTE - OBJECTIVE STATEMENT
34-year-old female past medical history of bipolar disorder, depression, presenting with bizarre behavior.  Patient accompanied by mother who is providing the majority of the history.  Over the past 3 weeks, patient has become increasingly more paranoid, closing the doors when speaking with people because she thinks people are listening, turning off Christy, unplugging the phone.    Patient is a , has a high stress job, she went to a work conference about 3 weeks ago, during which she discontinued her psychiatric medication for 3 days, and has been having difficulty sleeping, and increasingly paranoid since then.  Over the last week, patient has not slept much at all.  Since Saturday, patient has become catatonic, not speaking, staring off into space.  Saw psychiatrist today, who recommended inpatient psychiatric admission.  Patient went to [facility], however was referred to [place] ED for medical clearance.  Patient denies suicidal or homicidal ideation.  Denies drug or alcohol use.  No history of inpatient psychiatric hospitalizations in the past. 34-year-old female past medical history of bipolar disorder, depression, presenting with bizarre behavior.  Patient accompanied by mother who is providing the majority of the history.  Over the past 3 weeks, patient has become increasingly more paranoid, closing the doors when speaking with people because she thinks people are listening, turning off Christy, unplugging the phone.    Patient is a , has a high stress job, she went to a work conference about 3 weeks ago, during which she discontinued her psychiatric medication for 3 days (caplyta, trazodone), and has been having difficulty sleeping, and increasingly paranoid since then.  Over the last week, patient has not slept much at all.  Since Saturday, patient has become catatonic, not speaking, staring off into space.  Saw psychiatrist today, who recommended inpatient psychiatric admission.  Patient went to [facility], however was referred to [place] ED for medical clearance.  Patient denies suicidal or homicidal ideation.  Denies drug or alcohol use.  No history of inpatient psychiatric hospitalizations in the past.

## 2024-12-23 NOTE — ED PROVIDER NOTE - CLINICAL SUMMARY MEDICAL DECISION MAKING FREE TEXT BOX
patient presenting with bizarre behavior, appears to be catatonic, increasingly paranoid, cannot function at home, likely require acute inpatient psychiatric hospitalization.  Will obtain psych screening labs, have psychiatry evaluate patient.

## 2024-12-23 NOTE — ED BEHAVIORAL HEALTH ASSESSMENT NOTE - EMPLOYMENT
Show Applicator Variable?: Yes Consent: The patient's consent was obtained including but not limited to risks of crusting, scabbing, blistering, scarring, darker or lighter pigmentary change, recurrence, incomplete removal and infection. Render Post-Care Instructions In Note?: no Duration Of Freeze Thaw-Cycle (Seconds): 0 Post-Care Instructions: I reviewed with the patient in detail post-care instructions. Patient is to wear sunprotection, and avoid picking at any of the treated lesions. Pt may apply Vaseline to crusted or scabbing areas. Detail Level: Detailed Employed

## 2024-12-23 NOTE — ED BEHAVIORAL HEALTH ASSESSMENT NOTE - NSSUICRSKFACTOR_PSY_ALL_CORE
Detail Level: Simple Add 92169 Cpt? (Important Note: In 2017 The Use Of 27642 Is Being Tracked By Cms To Determine Future Global Period Reimbursement For Global Periods): no Current and Past Psychiatric Diagnoses/Presenting Symptoms/Treatment Related Factors/Activating Events/Stressors

## 2024-12-24 VITALS
TEMPERATURE: 98 F | RESPIRATION RATE: 18 BRPM | HEART RATE: 94 BPM | DIASTOLIC BLOOD PRESSURE: 70 MMHG | SYSTOLIC BLOOD PRESSURE: 116 MMHG | OXYGEN SATURATION: 97 %

## 2024-12-24 LAB
ANION GAP SERPL CALC-SCNC: 13 MMOL/L — SIGNIFICANT CHANGE UP (ref 5–17)
APPEARANCE UR: CLEAR — SIGNIFICANT CHANGE UP
BILIRUB UR-MCNC: NEGATIVE — SIGNIFICANT CHANGE UP
BUN SERPL-MCNC: 9.7 MG/DL — SIGNIFICANT CHANGE UP (ref 8–20)
CALCIUM SERPL-MCNC: 8.7 MG/DL — SIGNIFICANT CHANGE UP (ref 8.4–10.5)
CHLORIDE SERPL-SCNC: 104 MMOL/L — SIGNIFICANT CHANGE UP (ref 96–108)
CO2 SERPL-SCNC: 23 MMOL/L — SIGNIFICANT CHANGE UP (ref 22–29)
COLOR SPEC: SIGNIFICANT CHANGE UP
CREAT SERPL-MCNC: 0.69 MG/DL — SIGNIFICANT CHANGE UP (ref 0.5–1.3)
DIFF PNL FLD: NEGATIVE — SIGNIFICANT CHANGE UP
EGFR: 115 ML/MIN/1.73M2 — SIGNIFICANT CHANGE UP
GLUCOSE SERPL-MCNC: 87 MG/DL — SIGNIFICANT CHANGE UP (ref 70–99)
GLUCOSE UR QL: NEGATIVE MG/DL — SIGNIFICANT CHANGE UP
KETONES UR-MCNC: 15 MG/DL
LEUKOCYTE ESTERASE UR-ACNC: NEGATIVE — SIGNIFICANT CHANGE UP
NITRITE UR-MCNC: NEGATIVE — SIGNIFICANT CHANGE UP
PH UR: 5.5 — SIGNIFICANT CHANGE UP (ref 5–8)
POTASSIUM SERPL-MCNC: 3.9 MMOL/L — SIGNIFICANT CHANGE UP (ref 3.5–5.3)
POTASSIUM SERPL-SCNC: 3.9 MMOL/L — SIGNIFICANT CHANGE UP (ref 3.5–5.3)
PROT UR-MCNC: SIGNIFICANT CHANGE UP MG/DL
SODIUM SERPL-SCNC: 139 MMOL/L — SIGNIFICANT CHANGE UP (ref 135–145)
SP GR SPEC: >1.03 — HIGH (ref 1–1.03)
UROBILINOGEN FLD QL: 1 MG/DL — SIGNIFICANT CHANGE UP (ref 0.2–1)

## 2024-12-24 PROCEDURE — 84702 CHORIONIC GONADOTROPIN TEST: CPT

## 2024-12-24 PROCEDURE — G0378: CPT

## 2024-12-24 PROCEDURE — 36415 COLL VENOUS BLD VENIPUNCTURE: CPT

## 2024-12-24 PROCEDURE — 99214 OFFICE O/P EST MOD 30 MIN: CPT

## 2024-12-24 PROCEDURE — 99284 EMERGENCY DEPT VISIT MOD MDM: CPT | Mod: 25

## 2024-12-24 PROCEDURE — 85025 COMPLETE CBC W/AUTO DIFF WBC: CPT

## 2024-12-24 PROCEDURE — 80307 DRUG TEST PRSMV CHEM ANLYZR: CPT

## 2024-12-24 PROCEDURE — 99238 HOSP IP/OBS DSCHRG MGMT 30/<: CPT

## 2024-12-24 PROCEDURE — 81001 URINALYSIS AUTO W/SCOPE: CPT

## 2024-12-24 PROCEDURE — 80048 BASIC METABOLIC PNL TOTAL CA: CPT

## 2024-12-24 PROCEDURE — 80053 COMPREHEN METABOLIC PANEL: CPT

## 2024-12-24 PROCEDURE — 84443 ASSAY THYROID STIM HORMONE: CPT

## 2024-12-24 PROCEDURE — 96372 THER/PROPH/DIAG INJ SC/IM: CPT

## 2024-12-24 PROCEDURE — 87635 SARS-COV-2 COVID-19 AMP PRB: CPT

## 2024-12-24 PROCEDURE — 81003 URINALYSIS AUTO W/O SCOPE: CPT

## 2024-12-24 PROCEDURE — 93005 ELECTROCARDIOGRAM TRACING: CPT

## 2024-12-24 RX ORDER — DIVALPROEX SODIUM 500 MG
2 TABLET, DELAYED RELEASE (ENTERIC COATED) ORAL
Qty: 60 | Refills: 0
Start: 2024-12-24 | End: 2025-01-22

## 2024-12-24 NOTE — ED ADULT NURSE REASSESSMENT NOTE - NS ED NURSE REASSESS COMMENT FT1
pt ao calm and cooperative this am.  orders noted blood drawn and sent urine obtained and sent. awaiting re eval

## 2024-12-24 NOTE — ED CDU PROVIDER DISPOSITION NOTE - PATIENT PORTAL LINK FT
You can access the FollowMyHealth Patient Portal offered by [facility] by registering at the following website: [email or web address]. By joining [facility]’s FollowMyHealth portal, you will also be able to view your health information using other applications (apps) compatible with our system.

## 2024-12-24 NOTE — ED BEHAVIORAL HEALTH PROGRESS NOTE - LACKING INFO FOR PSYCH DISPO DETAILS FREE TEXT
awaiting collateral, presumed metabolism of amphetamines and reassessment upon receiving PRN medication

## 2024-12-24 NOTE — ED BEHAVIORAL HEALTH PROGRESS NOTE - RISK ASSESSMENT
Patient presents today much more linear and coherent than she was on exam yesterday - though she remains anxious and somewhat overinclusive in her thought process. In light of UTox being positive for amphetamines and rapid improvement with sleep, time and PRN medication, I must consider surreptitious/illicit amphetamine use as part of the differential, despite the fact that patient denies amphetamine use. It's possible, though unlikely, that the patient's Trazodone caused a false positive for the amphetamine assay so I discussed with the patient's outpatient provider to continue urine toxicology screening on her next visit as she did not receive any Trazodone while she has been here and am recommending discontinuing her Trazodone going forward as it's serotonergic activity may be precipitating shaun considering that undiagnosed bipolar disorder is on the differential (likely bipolar 2 disorder given the patient's history of depressive episodes and hypomanic presentation). Considering that the patient denies suicidality/homicidality, improvement in mental status exam, and her  denying acute safety concerns in the sense that patient has never exhibited risk to herself or others, she is psychiatrically cleared for discharge at this time. I discussed starting Depakote 1000 mg qhs for mood stabilizer effect, r/b/se discussed to which patient and  were understanding and agreeable and patient's outpatient provider is in agreement with continuing this medication. I advised the patient to continue her medical leave of absence from work for at least another week and advised against travel to which patient and her  were understanding and agreeable.

## 2024-12-24 NOTE — ED BEHAVIORAL HEALTH NOTE - BEHAVIORAL HEALTH NOTE
REBA Note: REBA made aware by SCARLET CASTILLO (Dr. Abraham) that pt is cleared by psych and will be a treat and release. SCARLET CASTILLO requested that REBA call pt's psychiatry office and schedule a follow up appointment for pt with her psychiatrist, Sissy. REBA called [facility] ([phone]) and spoke to the  who reported to SW that pt has follow up appointment already scheduled for December 26 at 4 PM. REBA made SCARLET CASTILLO aware. REBA informed pt of follow up appointment on December 26 at 4PM. SCARLET CASTILLO made pt's spouse aware of pt's psychiatrist appointment as well. No further SW needs noted at this time. SW signing off.

## 2024-12-24 NOTE — ED ADULT NURSE REASSESSMENT NOTE - NS ED NURSE REASSESS COMMENT FT1
pt awake alert and oriented x4. pt is cooperative with staff requests offers no complaints endorses discharge this am. pt educated about plan of care to be re-evaluated. pt able to effectively teach back the plan. no attempts to harm self or others.

## 2024-12-24 NOTE — ED CDU PROVIDER DISPOSITION NOTE - CLINICAL COURSE
34F history of bipolar disorder presented with bizarre behavior/paranoia, cleared by ED previously, placed in psychiatric observation, this morning mentating well, cleared by psychiatry for outpatient follow-up.  Stable for discharge.

## 2024-12-24 NOTE — ED ADULT NURSE REASSESSMENT NOTE - NS ED NURSE REASSESS COMMENT FT1
pt awake alert evaluated by psychiatry and cleared to be discharged home. pt endorses this plan. pt awaiting  to pick her up.

## 2024-12-24 NOTE — ED CDU PROVIDER DISPOSITION NOTE - NSFOLLOWUPINSTRUCTIONS_ED_ALL_ED_FT
Follow up with your psychiatry team as needed.    Return here or go to the nearest emergency department for repeat evaluation if you develop any new symptoms of acute concern such as intractable nausea/vomiting, chest pain with shortness of breath, or other new worries.
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
## 2024-12-24 NOTE — ED BEHAVIORAL HEALTH PROGRESS NOTE - DETAILS:
No acute events overnight since receiving PRN medication yesterday prior to initial assessment, no other PRNs required. I met with the patient at the bedside. She reports that she is feeling better today after having gotten a full night of sleep. She admits things have been difficult for her the last couple of months which she attributes to her high stress job, demands of motherhood and increased difficulty sleeping. She adamantly denies use of amphetamines, denies any other substance use despite UTox being positive for amphetamines. She denies SI/HI, AVH and other perceptual disturbances, acknowledges that her paranoia gets worse when she is not sleeping. She notes that between her last ER visit on Thanksgiving and when she spent a couple of nights in [place] for a conference a couple of weeks ago, she was doing better and acknowledges that when she leaves the house she feels that her routine gets affected, in particular her sleep routine and considers the effect that travel has on her mental health.    Collateral obtained from patient's outpatient provider Sissy who states that she has only been working with the patient briefly as she has only had 3 appointments with her. She reports that she started the patient on Caplyta 21 mg and started her on Trazodone for sleep which was titrated to a dose of 150 mg qhs. She reports that patient came in for her appointment yesterday and noted that the patient was demonstrating catatonia-like behaviors and in addition to a video that her family recorded of her, she thought it was best for the patient to come to the ER for evaluation. She has some concerns about the patient being discharged but notes that patient has robust family support which bodes well for her in terms of risk assessment and is in agreement with discontinuing Trazodone given that it may be flipping the patient into shaun and does not express acute safety concerns in the sense that she poses a risk to herself or others.

## 2025-01-02 ENCOUNTER — EMERGENCY (EMERGENCY)
Facility: HOSPITAL | Age: 35
LOS: 1 days | Discharge: OTHER INSTITUTION | End: 2025-01-02
Attending: EMERGENCY MEDICINE
Payer: COMMERCIAL

## 2025-01-02 VITALS
SYSTOLIC BLOOD PRESSURE: 114 MMHG | HEART RATE: 85 BPM | DIASTOLIC BLOOD PRESSURE: 81 MMHG | TEMPERATURE: 98 F | RESPIRATION RATE: 18 BRPM | OXYGEN SATURATION: 97 %

## 2025-01-02 VITALS
DIASTOLIC BLOOD PRESSURE: 86 MMHG | OXYGEN SATURATION: 97 % | SYSTOLIC BLOOD PRESSURE: 132 MMHG | RESPIRATION RATE: 20 BRPM | HEART RATE: 82 BPM | TEMPERATURE: 98 F

## 2025-01-02 DIAGNOSIS — Z96.22 MYRINGOTOMY TUBE(S) STATUS: Chronic | ICD-10-CM

## 2025-01-02 DIAGNOSIS — K61.0 ANAL ABSCESS: Chronic | ICD-10-CM

## 2025-01-02 LAB
ALBUMIN SERPL ELPH-MCNC: 4.8 G/DL — SIGNIFICANT CHANGE UP (ref 3.3–5.2)
ALP SERPL-CCNC: 43 U/L — SIGNIFICANT CHANGE UP (ref 40–120)
ALT FLD-CCNC: 15 U/L — SIGNIFICANT CHANGE UP
AMPHET UR-MCNC: NEGATIVE — SIGNIFICANT CHANGE UP
ANION GAP SERPL CALC-SCNC: 17 MMOL/L — SIGNIFICANT CHANGE UP (ref 5–17)
APAP SERPL-MCNC: <3 UG/ML — LOW (ref 10–26)
APPEARANCE UR: ABNORMAL
AST SERPL-CCNC: 15 U/L — SIGNIFICANT CHANGE UP
BACTERIA # UR AUTO: ABNORMAL /HPF
BARBITURATES UR SCN-MCNC: NEGATIVE — SIGNIFICANT CHANGE UP
BASOPHILS # BLD AUTO: 0.05 K/UL — SIGNIFICANT CHANGE UP (ref 0–0.2)
BASOPHILS NFR BLD AUTO: 0.5 % — SIGNIFICANT CHANGE UP (ref 0–2)
BENZODIAZ UR-MCNC: NEGATIVE — SIGNIFICANT CHANGE UP
BILIRUB SERPL-MCNC: 0.5 MG/DL — SIGNIFICANT CHANGE UP (ref 0.4–2)
BILIRUB UR-MCNC: NEGATIVE — SIGNIFICANT CHANGE UP
BUN SERPL-MCNC: 9 MG/DL — SIGNIFICANT CHANGE UP (ref 8–20)
CALCIUM SERPL-MCNC: 9.1 MG/DL — SIGNIFICANT CHANGE UP (ref 8.4–10.5)
CAST: 0 /LPF — SIGNIFICANT CHANGE UP (ref 0–4)
CHLORIDE SERPL-SCNC: 102 MMOL/L — SIGNIFICANT CHANGE UP (ref 96–108)
CO2 SERPL-SCNC: 20 MMOL/L — LOW (ref 22–29)
COCAINE METAB.OTHER UR-MCNC: NEGATIVE — SIGNIFICANT CHANGE UP
COLOR SPEC: SIGNIFICANT CHANGE UP
CREAT SERPL-MCNC: 0.61 MG/DL — SIGNIFICANT CHANGE UP (ref 0.5–1.3)
DIFF PNL FLD: NEGATIVE — SIGNIFICANT CHANGE UP
EGFR: 119 ML/MIN/1.73M2 — SIGNIFICANT CHANGE UP
EOSINOPHIL # BLD AUTO: 0.01 K/UL — SIGNIFICANT CHANGE UP (ref 0–0.5)
EOSINOPHIL NFR BLD AUTO: 0.1 % — SIGNIFICANT CHANGE UP (ref 0–6)
ETHANOL SERPL-MCNC: <10 MG/DL — SIGNIFICANT CHANGE UP (ref 0–9)
FENTANYL UR QL SCN: NEGATIVE — SIGNIFICANT CHANGE UP
FLUAV AG NPH QL: SIGNIFICANT CHANGE UP
FLUBV AG NPH QL: SIGNIFICANT CHANGE UP
GLUCOSE SERPL-MCNC: 114 MG/DL — HIGH (ref 70–99)
GLUCOSE UR QL: NEGATIVE MG/DL — SIGNIFICANT CHANGE UP
HCG SERPL-ACNC: <4 MIU/ML — SIGNIFICANT CHANGE UP
HCT VFR BLD CALC: 40.9 % — SIGNIFICANT CHANGE UP (ref 34.5–45)
HGB BLD-MCNC: 14.7 G/DL — SIGNIFICANT CHANGE UP (ref 11.5–15.5)
IMM GRANULOCYTES NFR BLD AUTO: 0.4 % — SIGNIFICANT CHANGE UP (ref 0–0.9)
KETONES UR-MCNC: 80 MG/DL
LEUKOCYTE ESTERASE UR-ACNC: NEGATIVE — SIGNIFICANT CHANGE UP
LYMPHOCYTES # BLD AUTO: 1.04 K/UL — SIGNIFICANT CHANGE UP (ref 1–3.3)
LYMPHOCYTES # BLD AUTO: 10.3 % — LOW (ref 13–44)
MCHC RBC-ENTMCNC: 31.1 PG — SIGNIFICANT CHANGE UP (ref 27–34)
MCHC RBC-ENTMCNC: 35.9 G/DL — SIGNIFICANT CHANGE UP (ref 32–36)
MCV RBC AUTO: 86.5 FL — SIGNIFICANT CHANGE UP (ref 80–100)
METHADONE UR-MCNC: NEGATIVE — SIGNIFICANT CHANGE UP
MONOCYTES # BLD AUTO: 0.54 K/UL — SIGNIFICANT CHANGE UP (ref 0–0.9)
MONOCYTES NFR BLD AUTO: 5.3 % — SIGNIFICANT CHANGE UP (ref 2–14)
NEUTROPHILS # BLD AUTO: 8.46 K/UL — HIGH (ref 1.8–7.4)
NEUTROPHILS NFR BLD AUTO: 83.4 % — HIGH (ref 43–77)
NITRITE UR-MCNC: NEGATIVE — SIGNIFICANT CHANGE UP
OPIATES UR-MCNC: NEGATIVE — SIGNIFICANT CHANGE UP
PCP SPEC-MCNC: SIGNIFICANT CHANGE UP
PCP UR-MCNC: NEGATIVE — SIGNIFICANT CHANGE UP
PH UR: 6 — SIGNIFICANT CHANGE UP (ref 5–8)
PLATELET # BLD AUTO: 212 K/UL — SIGNIFICANT CHANGE UP (ref 150–400)
POTASSIUM SERPL-MCNC: 3.3 MMOL/L — LOW (ref 3.5–5.3)
POTASSIUM SERPL-SCNC: 3.3 MMOL/L — LOW (ref 3.5–5.3)
PROT SERPL-MCNC: 7 G/DL — SIGNIFICANT CHANGE UP (ref 6.6–8.7)
PROT UR-MCNC: 30 MG/DL
RBC # BLD: 4.73 M/UL — SIGNIFICANT CHANGE UP (ref 3.8–5.2)
RBC # FLD: 11.9 % — SIGNIFICANT CHANGE UP (ref 10.3–14.5)
RBC CASTS # UR COMP ASSIST: 3 /HPF — SIGNIFICANT CHANGE UP (ref 0–4)
RSV RNA NPH QL NAA+NON-PROBE: SIGNIFICANT CHANGE UP
SALICYLATES SERPL-MCNC: <0.6 MG/DL — LOW (ref 10–20)
SARS-COV-2 RNA SPEC QL NAA+PROBE: SIGNIFICANT CHANGE UP
SODIUM SERPL-SCNC: 139 MMOL/L — SIGNIFICANT CHANGE UP (ref 135–145)
SP GR SPEC: >1.03 — HIGH (ref 1–1.03)
SQUAMOUS # UR AUTO: 17 /HPF — HIGH (ref 0–5)
THC UR QL: NEGATIVE — SIGNIFICANT CHANGE UP
TSH SERPL-MCNC: 1.43 UIU/ML — SIGNIFICANT CHANGE UP (ref 0.27–4.2)
UROBILINOGEN FLD QL: 1 MG/DL — SIGNIFICANT CHANGE UP (ref 0.2–1)
WBC # BLD: 10.14 K/UL — SIGNIFICANT CHANGE UP (ref 3.8–10.5)
WBC # FLD AUTO: 10.14 K/UL — SIGNIFICANT CHANGE UP (ref 3.8–10.5)
WBC UR QL: 1 /HPF — SIGNIFICANT CHANGE UP (ref 0–5)

## 2025-01-02 PROCEDURE — 99223 1ST HOSP IP/OBS HIGH 75: CPT

## 2025-01-02 PROCEDURE — 80053 COMPREHEN METABOLIC PANEL: CPT

## 2025-01-02 PROCEDURE — 93005 ELECTROCARDIOGRAM TRACING: CPT

## 2025-01-02 PROCEDURE — 84702 CHORIONIC GONADOTROPIN TEST: CPT

## 2025-01-02 PROCEDURE — 81001 URINALYSIS AUTO W/SCOPE: CPT

## 2025-01-02 PROCEDURE — 85025 COMPLETE CBC W/AUTO DIFF WBC: CPT

## 2025-01-02 PROCEDURE — G0378: CPT

## 2025-01-02 PROCEDURE — 36415 COLL VENOUS BLD VENIPUNCTURE: CPT

## 2025-01-02 PROCEDURE — 87637 SARSCOV2&INF A&B&RSV AMP PRB: CPT

## 2025-01-02 PROCEDURE — 90792 PSYCH DIAG EVAL W/MED SRVCS: CPT

## 2025-01-02 PROCEDURE — 84443 ASSAY THYROID STIM HORMONE: CPT

## 2025-01-02 PROCEDURE — 80307 DRUG TEST PRSMV CHEM ANLYZR: CPT

## 2025-01-02 PROCEDURE — 93010 ELECTROCARDIOGRAM REPORT: CPT

## 2025-01-02 PROCEDURE — 99284 EMERGENCY DEPT VISIT MOD MDM: CPT | Mod: 25

## 2025-01-02 RX ORDER — OLANZAPINE 15 MG/1
5 TABLET ORAL ONCE
Refills: 0 | Status: COMPLETED | OUTPATIENT
Start: 2025-01-02 | End: 2025-01-02

## 2025-01-02 RX ORDER — LORAZEPAM 1 MG/1
1 TABLET ORAL EVERY 6 HOURS
Refills: 0 | Status: DISCONTINUED | OUTPATIENT
Start: 2025-01-02 | End: 2025-01-02

## 2025-01-02 RX ORDER — OLANZAPINE 15 MG/1
5 TABLET ORAL DAILY
Refills: 0 | Status: ACTIVE | OUTPATIENT
Start: 2025-01-02 | End: 2025-12-01

## 2025-01-02 RX ORDER — LORAZEPAM 1 MG/1
1 TABLET ORAL ONCE
Refills: 0 | Status: DISCONTINUED | OUTPATIENT
Start: 2025-01-02 | End: 2025-01-02

## 2025-01-02 RX ORDER — POTASSIUM CHLORIDE 600 MG/1
40 TABLET, FILM COATED, EXTENDED RELEASE ORAL ONCE
Refills: 0 | Status: COMPLETED | OUTPATIENT
Start: 2025-01-02 | End: 2025-01-02

## 2025-01-02 RX ADMIN — LORAZEPAM 1 MILLIGRAM(S): 1 TABLET ORAL at 13:16

## 2025-01-02 RX ADMIN — POTASSIUM CHLORIDE 40 MILLIEQUIVALENT(S): 600 TABLET, FILM COATED, EXTENDED RELEASE ORAL at 16:22

## 2025-01-02 RX ADMIN — OLANZAPINE 5 MILLIGRAM(S): 15 TABLET ORAL at 13:16

## 2025-01-02 NOTE — ED ADULT NURSE NOTE - HPI (INCLUDE ILLNESS QUALITY, SEVERITY, DURATION, TIMING, CONTEXT, MODIFYING FACTORS, ASSOCIATED SIGNS AND SYMPTOMS)
Patient comes to  after  being medically cleared in the main ED. She recognized staff from previous visit to hospital about a week ago. Mother reports pt was being taken to her doctors appointment this morning when she became agitated, disrobed and opened the car door threatening to jump out. Mother brought pt to ED. Upon arrival to , pt was disorganized, thought blocked and unable to explain why she was in the hospital. Plan is to transfer to inpatient facility when bed becomes available. Pt denied any thoughts to harm self/others. UTOX and BAL negative.

## 2025-01-02 NOTE — ED PROVIDER NOTE - CLINICAL SUMMARY MEDICAL DECISION MAKING FREE TEXT BOX
Patient is a 33yo F, currently undergoing evaluation for BPD I who presents to the ED complaining of bizarre behavior. Will get psych clearance labs

## 2025-01-02 NOTE — ED ADULT NURSE NOTE - NSFALLUNIVINTERV_ED_ALL_ED
Monitor gait and stability/Bed/Stretcher in lowest position, wheels locked, appropriate side rails in place/Physically safe environment - no spills, clutter or unnecessary equipment

## 2025-01-02 NOTE — ED ADULT NURSE REASSESSMENT NOTE - NS ED NURSE REASSESS COMMENT FT1
pt changed into yellow gown. mother Ana Cristina at bedside taking home valuables including cell phone and purse.
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
## 2025-01-02 NOTE — ED ADULT TRIAGE NOTE - CHIEF COMPLAINT QUOTE
pt arrives with unorganized thoughts, states she is fasting, supposed to go to PMD  for blood work but her mom brought her here. pt pacing around ED unable to answer questions.
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
## 2025-01-02 NOTE — ED CDU PROVIDER INITIAL DAY NOTE - ATTENDING CONTRIBUTION TO CARE
I, Tatianna Burris, performed the initial face to face bedside interview with this patient regarding history of present illness, review of symptoms and relevant past medical, social and family history.  I completed an independent physical examination.  I was the initial provider who evaluated this patient. I have signed out the follow up of any pending tests (i.e. labs, radiological studies) to the resident.  I have communicated the patient’s plan of care and disposition with the resident.
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
## 2025-01-02 NOTE — ED BEHAVIORAL HEALTH ASSESSMENT NOTE - DETAILS
two sons 6 & 3 spoke with pt's mother and  see HPI deferred  has firearms secured pending bed availability

## 2025-01-02 NOTE — ED PROVIDER NOTE - OBJECTIVE STATEMENT
Patient is a 33yo F, currently undergoing evaluation for BPD I who presents to the ED complaining of bizarre behavior. Patient's mom reports she has been really restless and anxious, had a catatonic episode last night, and this morning tried to jump out of her car as she was driving her to her PCP for her yearly check up. Patient was recently seen in the ED for similar complaints, utox found to be positive for amphetamines (which patient denied taking), stopped trazodone and discharged as she has good family support. Patient has not yet made her follow up appointment with psychiatry, mom brought her here because she doesn't feel like she is safe at home. Patient denies SI/HI, audio or visual hallucinations.

## 2025-01-02 NOTE — ED CDU PROVIDER INITIAL DAY NOTE - PHYSICAL EXAMINATION
Gen: AAOx3, NAD  HEENT: Normocephalic atraumatic. EOMI. No scleral icterus. Moist mucus membranes.  CV: RRR. Audible S1 and S2. No murmurs. 2+ radial and PT pulses   Pulm: Clear to auscultation bilaterally. No wheezes, rales, or rhonchi. No accessory muscle use or respiratory distress.  Abdomen: soft, normoactive BS, non distended, nontender, no rebound, no guarding  Musculoskeletal:  Moving all extremities equally. No gross deformity. No tenderness to palpation.  Skin: No rashes or lesions. Warm.  Neurologic: No focal neurological deficits. CN II-XII grossly intact.  : no CVA tenderness  Psych: Anxious mood and affect. Cooperative.

## 2025-01-02 NOTE — ED BEHAVIORAL HEALTH ASSESSMENT NOTE - CURRENT MEDICATION
current medication regiment is unknown  reports compliance with medication but cannot recall names of medications; previously documented caplyta and was started on depakote at last ED visit

## 2025-01-02 NOTE — ED BEHAVIORAL HEALTH ASSESSMENT NOTE - DESCRIPTION
asthma as above Vital Signs Last 24 Hrs  T(C): 36.7 (01-02-25 @ 11:15), Max: 36.7 (01-02-25 @ 08:30)  T(F): 98 (01-02-25 @ 11:15), Max: 98.1 (01-02-25 @ 08:30)  HR: 88 (01-02-25 @ 11:15) (82 - 88)  BP: 136/84 (01-02-25 @ 11:15) (132/86 - 136/84)  BP(mean): --  RR: 18 (01-02-25 @ 11:15) (18 - 20)  SpO2: 97% (01-02-25 @ 11:15) (97% - 97%)

## 2025-01-02 NOTE — ED PROVIDER NOTE - PHYSICAL EXAMINATION
Gen: AAOx3, anxious and restless  HEENT: Normocephalic atraumatic. EOMI. No scleral icterus. Moist mucus membranes.  CV: RRR. Audible S1 and S2. No murmurs. 2+ radial and PT pulses   Pulm: Clear to auscultation bilaterally. No wheezes, rales, or rhonchi. No accessory muscle use or respiratory distress.  Abdomen: soft, normoactive BS, non distended, nontender, no rebound, no guarding  Musculoskeletal:  Moving all extremities equally. No gross deformity. No tenderness to palpation.  Skin: No rashes or lesions. Warm.  Neurologic: No focal neurological deficits. CN II-XII grossly intact.  Psych: Anxious mood and affect. Cooperative.

## 2025-01-02 NOTE — ED CDU PROVIDER INITIAL DAY NOTE - CLINICAL SUMMARY MEDICAL DECISION MAKING FREE TEXT BOX
Patient is a 33yo F, currently undergoing evaluation for BPD I who presents to the ED complaining of bizarre behavior. Patient was medically cleared and then evaluated by  and determined to require involuntary psychiatric admission. Placed in observation pending bed placement.

## 2025-01-02 NOTE — ED ADULT NURSE NOTE - BEFAST ARM SIDE DRIFT
Medication(s) Requested:   ALPRAZolam (XANAX) 1 MG tablet 60 tablet 2 12/13/2023 --    Sig: TAKE 1 TABLET BY MOUTH TWICE A DAY AS NEEDED FOR ANXIETY      Last office visit: 07/17/23 cpe  Next office visit: 07/22/24 cpe     Is the patient due for refill of this medication(s): Yes  PDMP review: Criteria met. Forwarded to Physician/JUDY for signature.      No

## 2025-01-02 NOTE — ED BEHAVIORAL HEALTH NOTE - BEHAVIORAL HEALTH NOTE
SW Note: [facility]A made aware by Tele[facility] sch team that [facility] is accepting pt for inpatient psych. Pt is accepted to [facility] w/ Dr. Contreras 9.27 involuntary status.  RN notified of pt transfer and completed RN to RN report. SW notified pt's ED Provider of [facility] transfer.  RN made pt aware of bed availability at [facility]. Pt, pt's ED provider, and  team all in agreement with plan for transfer to [facility]. SW set up transport through [facility] spoke to Xenia ([phone]). [facility] ambulance form provided to pt. No further SW needs noted. SW signing off.

## 2025-01-08 DIAGNOSIS — F43.10 POST-TRAUMATIC STRESS DISORDER, UNSPECIFIED: ICD-10-CM

## 2025-01-10 NOTE — ED ADULT NURSE NOTE - CHPI ED NUR SYMPTOMS POS
well developed, well nourished , in no acute distress , ambulating without difficulty , normal communication ability
insomnia/ migraine

## 2025-01-28 NOTE — OB RN PATIENT PROFILE - NS_OBGYNHISTORY_OBGYN_ALL_OB_FT
[Nl] : Integumentary
Previous  Section in 2018.  EDC: 21; .  She is scheduled for a repeat  and tubal ligation on 5/10/21. No signs or symptoms of  labor.  No fetal anomalies reported.  No preclampsia or gestational diabetes reported.

## 2025-02-05 NOTE — DISCHARGE NOTE OB - LAUNCH MEDICATION RECONCILIATION
Traumatic Finger Amputation  A traumatic finger amputation is when a person loses part or all of a finger because of an accident or injury. This condition is a medical emergency. It needs to be treated right away to prevent more damage to the finger and to reattach the lost part of the finger if that is possible.    Amputations of the finger can be:  Partial. This means that some structures remain attached.  Complete. The entire finger is removed.  What are the causes?  This condition usually results from an accident that involves:  A car.  Power tools.  Factory work.  Farm or lawn equipment.  A sharp cut.  What are the signs or symptoms?  Symptoms of this condition include:  Bleeding.  Pain.  Damage to surrounding tissues, such as bones, muscles, tendons, and skin.  Damage or removal of the nail bed.  How is this diagnosed?  This condition is diagnosed with a physical exam. During the exam, your health care provider will determine how severe the injury is and the best way to treat it.    X-rays may be done to check for damage to the surrounding bones and tissues.    How is this treated?  Large bandages being applied to two fingers.  This condition is treated by cleaning the wound thoroughly and taking medicines for pain. Additional treatment depends on the type of injury that you have and how severe it is:  If only the tip of your finger was removed, treatment may involve placing a protective bandage (dressing) over the wound and cleaning the wound regularly.  If the injury is severe, a portion of skin may be taken from another part of the body (graft) and attached to the wound site until the wound heals.  If a large portion of the finger was cut off, treatment may include a surgical procedure to reattach the finger (replantation).  Follow these instructions at home:  Medicines    Take over-the-counter and prescription medicines as told by your health care provider.  Ask your health care provider if the medicine prescribed to you:  Requires you to avoid driving or using machinery.  Can cause constipation. You may need to take these actions to prevent or treat constipation:  Drink enough fluid to keep your urine pale yellow.  Take over-the-counter or prescription medicines.  Eat foods that are high in fiber, such as beans, whole grains, and fresh fruits and vegetables.  Limit foods that are high in fat and processed sugars, such as fried or sweet foods.  If you were prescribed an antibiotic medicine, use it as told by your health care provider. Do not stop using the antibiotic even if you start to feel better.  Wound care    Follow instructions from your health care provider about how to care for your wound. Make sure you:  Wash your hands with soap and water for at least 20 seconds before and after you change your bandage (dressing). If soap and water are not available, use hand .  Change your dressing as told by your health care provider.  Leave stitches (sutures), skin glue, or adhesive strips in place. These skin closures may need to stay in place for 2 weeks or longer. If adhesive strip edges start to loosen and curl up, you may trim the loose edges. Do not remove adhesive strips completely unless your health care provider tells you to do that.  Check your wound every day for signs of infection such as:  Redness, swelling, or pain.  Fluid or blood.  Warmth.  Pus or a bad smell.  General instructions    Do exercises as told by your health care provider to strengthen your finger and hand.  Raise (elevate) the injured area above the level of your heart while you are sitting or lying down.  Keep all follow-up visits. This is important.  Contact a health care provider if:  Your wound does not seem to be healing well.  You have redness, swelling, or pain around your wound.  You have fluid or blood coming from your wound.  Your wound feels warm to the touch.  You have pus or a bad smell coming from your wound.  You have a fever.  Get help right away if:  You have redness spreading or extending from your wound.  Summary  A traumatic finger amputation is when a person loses part or all of a finger because of an accident or injury.  This condition is diagnosed with a physical exam.  This is usually treated by cleaning the wound and placing a bandage over it, using a skin graft to help heal the wound, or having surgery to reattach the amputated finger.  Follow instructions from your health care provider about how to take care of your wound.  This information is not intended to replace advice given to you by your health care provider. Make sure you discuss any questions you have with your health care provider. <<-----Click here for Discharge Medication Review

## 2025-07-10 NOTE — OB PST NOTE - TEMPERATURE IN FAHRENHEIT (DEGREES F)
[facility]                   [address] [place] [address]   PRE-ADMISSION TESTING    [phone]     Surgery Date:  Tuesday, 7/29/25         INSTRUCTIONS BEFORE YOUR SURGERY   Arrival Time [facility] Pre-op staff will call you between 3 and 7pm the day before your surgery with your arrival time. If your surgery is on a Monday, they will call you the Friday before. If it's after 7pm the day prior to surgery and you have not received a time yet, please call [phone].   Where to Check In   Come through the Main Hospital entrance. Take the elevators on the left side of the lobby to the 2nd floor. The admitting desk will be on your right.     Please bring the following items to register:  photo ID, insurance card, co-pay if needed, medical directive, DNR, and/or POA if applicable.     Free  parking is available 7am to 5pm.   Food Drink Alcohol Marijuana    No food or drink (gum, mints, coffee, juice, etc) after midnight the night before surgery.      No alcohol (beer, wine, liquor) or marijuana 24 hours before or after surgery.           You may drink WATER ONLY up until 2 hours prior to your surgery time. Please do not      add anything to your water as this may result in your surgery being postponed.       If you are a diabetic, glucose tablets may be taken with water for low blood sugar if needed.   PRE-OP Medication Instructions      MEDICATIONS TO TAKE THE MORNING OF SURGERY:   Duloxetine, Famotidine                      Medications to STOP 7 Days Before Surgery Non-Steroidal anti-inflammatory Drugs (NSAID's): for example: Ibuprofen, Advil, Motrin, Naproxen, Aleve  Aspirin and Aspirin containing products (BC Powder, Excedrin, etc.)  Weight loss and/or diabetic GLP1 medications (Wegovy, Ozempic, Semaglutide, Trulicity, Mounjaro, Zepbound, Tirzepatide, etc)  Herbal supplements, vitamins, and fish oil  Other:   Pre-op Hygiene     If CHG wash was provided to you at your  97.6

## 2025-07-13 NOTE — ED ADULT NURSE NOTE - AVIAN FLU WORK
Nursing assessment completed. Patient awake and visible during the shift. Spends time playing card games and socializing with peer (EI) and staff in the lounge. He asks writer what his discharge planning looks like. He states he found an [facility] in [place], [place] that has an opening. Writer encouraged him to share this information with the CM on Monday.   Patient latter asked writer to talk. He stated he did not think he needs to be in the hospital and stated he is feeling funny from his new medications. Medication education provided, which he was receptive to. He states he is looking forward to being able to go back to work sometime soon.            No